# Patient Record
Sex: FEMALE | Race: ASIAN | Employment: UNEMPLOYED | URBAN - METROPOLITAN AREA
[De-identification: names, ages, dates, MRNs, and addresses within clinical notes are randomized per-mention and may not be internally consistent; named-entity substitution may affect disease eponyms.]

---

## 2017-03-06 ENCOUNTER — OFFICE VISIT (OUTPATIENT)
Dept: FAMILY MEDICINE CLINIC | Facility: CLINIC | Age: 57
End: 2017-03-06

## 2017-03-06 VITALS
HEART RATE: 80 BPM | SYSTOLIC BLOOD PRESSURE: 128 MMHG | HEIGHT: 62 IN | DIASTOLIC BLOOD PRESSURE: 70 MMHG | TEMPERATURE: 97 F | WEIGHT: 178 LBS | RESPIRATION RATE: 16 BRPM | BODY MASS INDEX: 32.76 KG/M2

## 2017-03-06 DIAGNOSIS — K63.5 HYPERPLASTIC COLONIC POLYP, UNSPECIFIED PART OF COLON: ICD-10-CM

## 2017-03-06 DIAGNOSIS — E03.9 ACQUIRED HYPOTHYROIDISM: ICD-10-CM

## 2017-03-06 DIAGNOSIS — E11.9 DIET-CONTROLLED TYPE 2 DIABETES MELLITUS (HCC): Chronic | ICD-10-CM

## 2017-03-06 DIAGNOSIS — R30.0 DYSURIA: Primary | ICD-10-CM

## 2017-03-06 DIAGNOSIS — E78.2 MIXED HYPERLIPIDEMIA: ICD-10-CM

## 2017-03-06 LAB
APPEARANCE: CLEAR
BILIRUBIN: NEGATIVE
CREAT UR-SCNC: 117 MG/DL
GLUCOSE (URINE DIPSTICK): NEGATIVE MG/DL
KETONES (URINE DIPSTICK): NEGATIVE MG/DL
MICROALBUMIN UR-MCNC: 1.41 MG/DL
MICROALBUMIN/CREAT 24H UR-RTO: 12.1 UG/MG (ref ?–30)
MULTISTIX LOT#: ABNORMAL NUMERIC
NITRITE, URINE: NEGATIVE
OCCULT BLOOD: NEGATIVE
PH, URINE: 8 (ref 4.5–8)
SPECIFIC GRAVITY: 1.02 (ref 1–1.03)
URINE-COLOR: YELLOW
UROBILINOGEN,SEMI-QN: 0.2 MG/DL (ref 0–1.9)

## 2017-03-06 PROCEDURE — 82043 UR ALBUMIN QUANTITATIVE: CPT | Performed by: FAMILY MEDICINE

## 2017-03-06 PROCEDURE — 81003 URINALYSIS AUTO W/O SCOPE: CPT | Performed by: FAMILY MEDICINE

## 2017-03-06 PROCEDURE — 99214 OFFICE O/P EST MOD 30 MIN: CPT | Performed by: FAMILY MEDICINE

## 2017-03-06 PROCEDURE — 87086 URINE CULTURE/COLONY COUNT: CPT | Performed by: FAMILY MEDICINE

## 2017-03-06 PROCEDURE — 82570 ASSAY OF URINE CREATININE: CPT | Performed by: FAMILY MEDICINE

## 2017-03-06 RX ORDER — NYSTATIN 100000 U/G
1 CREAM TOPICAL 2 TIMES DAILY
Qty: 30 G | Refills: 1 | Status: SHIPPED | OUTPATIENT
Start: 2017-03-06 | End: 2017-03-13

## 2017-03-06 NOTE — PROGRESS NOTES
Patient presents with:  Abdominal Pain: Dx with UTI in [facility] 1 month ago - treated with antibiotics. Still with abdominal dicscomfort.  Pt states had bladder sling placed 10 years ago  Burning On Urination: occ      HPI:   This is a 64year old female comin weakness and numbness. Hematological: Negative for adenopathy. EXAM:   /70 mmHg  Pulse 80  Temp(Src) 96.9 °F (36.1 °C) (Oral)  Resp 16  Ht 62\"  Wt 178 lb  BMI 32.55 kg/m2 Body mass index is 32.55 kg/(m^2).    Physical Exam   Nursing note and compliance and exercise, see ophthalmology soon, check feet daily and will check labs as ordered.                  Relevant Orders    COMP METABOLIC PANEL (14)    LIPID PANEL    HEMOGLOBIN A1C    MICROALB/CREAT RATIO, RANDOM URINE    Hypothyroidism    Overv

## 2017-03-07 NOTE — ASSESSMENT & PLAN NOTE
Stable, Continue present management.     Cholesterol Lowering Medications          SIMVASTATIN 40 MG Oral Tab    Choline Fenofibrate (FENOFIBRIC ACID) 135 MG Oral Capsule Delayed Release

## 2017-03-07 NOTE — ASSESSMENT & PLAN NOTE
As for her Diabetes, it is well controlled, no significant medication side effects noted.      Recommendations are: continue present meds, lose weight by increased dietary compliance and exercise, see ophthalmology soon, check feet daily and will check labs

## 2017-03-07 NOTE — ASSESSMENT & PLAN NOTE
Stable, Continue present management.     Thyroid  (most recent labs)     Lab Results  Component Value Date/Time   TSH 2.300 09/09/2016 12:54 PM   T4F 1.1 09/09/2016 12:54 PM         Endocrine Medications          LEVOTHYROXINE SODIUM 50 MCG Oral Tab

## 2017-03-08 ENCOUNTER — TELEPHONE (OUTPATIENT)
Dept: FAMILY MEDICINE CLINIC | Facility: CLINIC | Age: 57
End: 2017-03-08

## 2017-03-08 NOTE — TELEPHONE ENCOUNTER
Urine culture ninoska back yet. Pt notified. Advised Pt will be notified once urine culture received.

## 2017-04-05 ENCOUNTER — PRIOR ORIGINAL RECORDS (OUTPATIENT)
Dept: OTHER | Age: 57
End: 2017-04-05

## 2017-04-11 ENCOUNTER — TELEPHONE (OUTPATIENT)
Dept: FAMILY MEDICINE CLINIC | Facility: CLINIC | Age: 57
End: 2017-04-11

## 2017-04-11 NOTE — TELEPHONE ENCOUNTER
Received copy of lab results DOS 4/5/17 from Dr. Rocio Choudhary. Lab results abstracted into epic and sent to scan.

## 2017-05-04 ENCOUNTER — TELEPHONE (OUTPATIENT)
Dept: FAMILY MEDICINE CLINIC | Facility: CLINIC | Age: 57
End: 2017-05-04

## 2017-06-18 DIAGNOSIS — E03.9 ACQUIRED HYPOTHYROIDISM: Primary | ICD-10-CM

## 2017-06-20 NOTE — TELEPHONE ENCOUNTER
Incoming request for Levothyroxine. LOV 9/12/2016 last TSH done 9/9/2016. No future appointments. Per LOV patient to f/u in 6 months. Left message for patient to schedule f/u.

## 2017-06-26 RX ORDER — NYSTATIN 100000 U/G
CREAM TOPICAL
Refills: 1 | COMMUNITY
Start: 2017-06-18 | End: 2018-01-26

## 2017-06-26 RX ORDER — LEVOTHYROXINE SODIUM 0.05 MG/1
TABLET ORAL
Qty: 90 TABLET | Refills: 0 | Status: SHIPPED | OUTPATIENT
Start: 2017-06-26 | End: 2017-10-24

## 2017-09-14 ENCOUNTER — OFFICE VISIT (OUTPATIENT)
Dept: OBGYN CLINIC | Facility: CLINIC | Age: 57
End: 2017-09-14

## 2017-09-14 VITALS
BODY MASS INDEX: 28.53 KG/M2 | SYSTOLIC BLOOD PRESSURE: 130 MMHG | WEIGHT: 161 LBS | HEIGHT: 63 IN | DIASTOLIC BLOOD PRESSURE: 70 MMHG

## 2017-09-14 DIAGNOSIS — N94.9 VAGINAL BURNING: ICD-10-CM

## 2017-09-14 DIAGNOSIS — R10.2 PELVIC PAIN: Primary | ICD-10-CM

## 2017-09-14 DIAGNOSIS — Z12.4 ROUTINE PAPANICOLAOU SMEAR: ICD-10-CM

## 2017-09-14 PROCEDURE — 87624 HPV HI-RISK TYP POOLED RSLT: CPT | Performed by: OBSTETRICS & GYNECOLOGY

## 2017-09-14 PROCEDURE — 88175 CYTOPATH C/V AUTO FLUID REDO: CPT | Performed by: OBSTETRICS & GYNECOLOGY

## 2017-09-14 PROCEDURE — 99202 OFFICE O/P NEW SF 15 MIN: CPT | Performed by: OBSTETRICS & GYNECOLOGY

## 2017-09-14 NOTE — PROGRESS NOTES
Patient is a 64year old here for evaluation of one and a half hx of pelvic discomfort associated with vaginal burning. Last seen April 2014 for D&C due to recurrent postmenopausal bleeding.  Benign endometrial polyps with negative background endometrium on once ultrasound returns. Over 20 minutes spent with pt updating hx and evaluation of presenting problems.

## 2017-09-20 LAB — HPV I/H RISK 1 DNA SPEC QL NAA+PROBE: NEGATIVE

## 2017-10-04 ENCOUNTER — PRIOR ORIGINAL RECORDS (OUTPATIENT)
Dept: OTHER | Age: 57
End: 2017-10-04

## 2017-10-24 DIAGNOSIS — E03.9 ACQUIRED HYPOTHYROIDISM: ICD-10-CM

## 2017-10-25 RX ORDER — LEVOTHYROXINE SODIUM 0.05 MG/1
TABLET ORAL
Qty: 90 TABLET | Refills: 1 | Status: SHIPPED | OUTPATIENT
Start: 2017-10-25 | End: 2018-01-26 | Stop reason: DRUGHIGH

## 2017-11-15 ENCOUNTER — HOSPITAL ENCOUNTER (OUTPATIENT)
Dept: MAMMOGRAPHY | Age: 57
Discharge: HOME | End: 2017-11-15
Attending: INTERNAL MEDICINE
Payer: COMMERCIAL

## 2017-11-15 DIAGNOSIS — Z12.31 ENCOUNTER FOR SCREENING MAMMOGRAM FOR MALIGNANT NEOPLASM OF BREAST: ICD-10-CM

## 2017-11-15 PROCEDURE — 77067 SCR MAMMO BI INCL CAD: CPT | Performed by: INTERNAL MEDICINE

## 2017-11-24 DIAGNOSIS — E78.00 HYPERCHOLESTEROLEMIA: ICD-10-CM

## 2017-11-24 DIAGNOSIS — E03.9 ACQUIRED HYPOTHYROIDISM: Primary | ICD-10-CM

## 2017-11-28 ENCOUNTER — APPOINTMENT (OUTPATIENT)
Dept: LAB | Age: 57
End: 2017-11-28
Attending: FAMILY MEDICINE
Payer: COMMERCIAL

## 2017-11-28 DIAGNOSIS — E03.9 ACQUIRED HYPOTHYROIDISM: ICD-10-CM

## 2017-11-28 DIAGNOSIS — E11.9 DIET-CONTROLLED TYPE 2 DIABETES MELLITUS (HCC): Chronic | ICD-10-CM

## 2017-11-28 PROCEDURE — 80053 COMPREHEN METABOLIC PANEL: CPT | Performed by: FAMILY MEDICINE

## 2017-11-28 PROCEDURE — 36415 COLL VENOUS BLD VENIPUNCTURE: CPT | Performed by: FAMILY MEDICINE

## 2017-11-28 PROCEDURE — 83036 HEMOGLOBIN GLYCOSYLATED A1C: CPT | Performed by: FAMILY MEDICINE

## 2017-11-28 PROCEDURE — 84439 ASSAY OF FREE THYROXINE: CPT | Performed by: FAMILY MEDICINE

## 2017-11-28 PROCEDURE — 84443 ASSAY THYROID STIM HORMONE: CPT | Performed by: FAMILY MEDICINE

## 2017-11-28 PROCEDURE — 80061 LIPID PANEL: CPT | Performed by: FAMILY MEDICINE

## 2017-11-29 RX ORDER — ALPRAZOLAM 0.5 MG/1
TABLET ORAL
Qty: 30 TABLET | Refills: 1 | OUTPATIENT
Start: 2017-11-29 | End: 2018-11-28

## 2017-11-29 RX ORDER — LEVOTHYROXINE SODIUM 0.07 MG/1
75 TABLET ORAL
Qty: 90 TABLET | Refills: 1 | Status: SHIPPED | OUTPATIENT
Start: 2017-11-29 | End: 2018-11-28

## 2017-11-29 RX ORDER — FENOFIBRIC ACID 135 MG/1
CAPSULE, DELAYED RELEASE ORAL
Qty: 90 CAPSULE | Refills: 0 | Status: SHIPPED | OUTPATIENT
Start: 2017-11-29 | End: 2018-01-23

## 2017-11-29 RX ORDER — SIMVASTATIN 40 MG
TABLET ORAL
Qty: 90 TABLET | Refills: 0 | Status: SHIPPED | OUTPATIENT
Start: 2017-11-29 | End: 2018-01-23

## 2017-11-29 NOTE — TELEPHONE ENCOUNTER
Labs were done has been taking levothyroxine 50 mcg so sent in new order for 75 mcg she is also asking to get refill of alprazolam. I told her she might need a appointment but she will call back to be seen in December.

## 2017-11-29 NOTE — TELEPHONE ENCOUNTER
Rx for Alprazolam called to [facility]. Called pt and notified her that she needs appt in 1-2 months. She said that she will call back to schedule appt.

## 2017-11-30 ENCOUNTER — HOSPITAL ENCOUNTER (OUTPATIENT)
Dept: MAMMOGRAPHY | Facility: HOSPITAL | Age: 57
Discharge: HOME | End: 2017-11-30
Attending: INTERNAL MEDICINE
Payer: COMMERCIAL

## 2017-11-30 DIAGNOSIS — R92.2 INCONCLUSIVE MAMMOGRAM: ICD-10-CM

## 2017-11-30 PROCEDURE — 77061 BREAST TOMOSYNTHESIS UNI: CPT | Performed by: INTERNAL MEDICINE

## 2017-11-30 PROCEDURE — 77065 DX MAMMO INCL CAD UNI: CPT | Performed by: INTERNAL MEDICINE

## 2017-11-30 PROCEDURE — 76642 ULTRASOUND BREAST LIMITED: CPT | Performed by: INTERNAL MEDICINE

## 2017-11-30 NOTE — IMAGING NOTE
Asssisted Dr. Ray Camacho with recommendation for a left breast biopsy for nodule. Emotional and educational support provided. Written information provided to [facility].  Our breast center schedulers will call pt within 72 hours to schedule an appointmen

## 2017-12-05 ENCOUNTER — HOSPITAL ENCOUNTER (OUTPATIENT)
Dept: MAMMOGRAPHY | Facility: HOSPITAL | Age: 57
Discharge: HOME | End: 2017-12-05
Attending: INTERNAL MEDICINE
Payer: COMMERCIAL

## 2017-12-05 DIAGNOSIS — N63.0 BREAST MASS: ICD-10-CM

## 2017-12-05 PROCEDURE — 19081 BX BREAST 1ST LESION STRTCTC: CPT | Performed by: INTERNAL MEDICINE

## 2017-12-05 PROCEDURE — 88305 TISSUE EXAM BY PATHOLOGIST: CPT | Performed by: INTERNAL MEDICINE

## 2017-12-06 ENCOUNTER — TELEPHONE (OUTPATIENT)
Dept: MAMMOGRAPHY | Facility: HOSPITAL | Age: 57
End: 2017-12-06

## 2017-12-06 NOTE — TELEPHONE ENCOUNTER
Telephoned Renetta Corbett and name,  verified with pt. Notified Renetta Corbett of benign left breast stereotactic biopsy result. Senait Yoo reports biopsy site is healing well. Hematoma management discussed.  Radiologist recommends next mammogram

## 2017-12-31 ENCOUNTER — PRIOR ORIGINAL RECORDS (OUTPATIENT)
Dept: OTHER | Age: 57
End: 2017-12-31

## 2018-01-23 DIAGNOSIS — E78.00 HYPERCHOLESTEROLEMIA: ICD-10-CM

## 2018-01-23 DIAGNOSIS — R30.0 DYSURIA: ICD-10-CM

## 2018-01-24 RX ORDER — FENOFIBRIC ACID 135 MG/1
CAPSULE, DELAYED RELEASE ORAL
Qty: 90 CAPSULE | Refills: 0 | Status: SHIPPED | OUTPATIENT
Start: 2018-01-24 | End: 2018-11-28 | Stop reason: ALTCHOICE

## 2018-01-24 RX ORDER — SIMVASTATIN 40 MG
TABLET ORAL
Qty: 90 TABLET | Refills: 0 | Status: SHIPPED | OUTPATIENT
Start: 2018-01-24 | End: 2018-11-28

## 2018-01-24 NOTE — TELEPHONE ENCOUNTER
Please call Pt and assist with scheduling Diabetes follow up. .  Pt is past due Then after appt made, forward message to Dr Osei Taylor for refill approval for Nystatin. Routed to [facility].

## 2018-01-24 NOTE — TELEPHONE ENCOUNTER
Pt out of town. .will be back in 1 month and states she will call back to schedule her Diabetes appt w/Dr Willis Pair

## 2018-01-26 DIAGNOSIS — E03.9 ACQUIRED HYPOTHYROIDISM: ICD-10-CM

## 2018-01-26 RX ORDER — NYSTATIN 100000 U/G
CREAM TOPICAL
Qty: 30 G | Refills: 1 | Status: SHIPPED | OUTPATIENT
Start: 2018-01-26 | End: 2018-11-28

## 2018-01-26 NOTE — TELEPHONE ENCOUNTER
Pt requesting refill Nystatin. All other refills have bene addressed. Nystatin order pended. Will you refill ? Routed to DR Jones.

## 2018-01-26 NOTE — TELEPHONE ENCOUNTER
Pt made appt for late March for when she comes back from [facility] but is leaving tomorrow morning and would like to have FENOFIBRIC ACID 135 MG Oral Capsule Delayed Release, SIMVASTATIN 40 MG Oral Tab,nystatin 900037 UNIT/GM External Cream these refilled to t

## 2018-01-26 NOTE — TELEPHONE ENCOUNTER
Please call Pt and advise that she is past due for appt per Dr Winston Thompson instruction at her [address] [place] [address] Avenue 03/06/2017. Once appt is made, please forward refill request pended to Dr Winston Thompson. Routed to [facility].

## 2018-01-31 RX ORDER — NYSTATIN 100000 U/G
CREAM TOPICAL
Qty: 30 G | Refills: 0 | OUTPATIENT
Start: 2018-01-31

## 2018-04-24 ENCOUNTER — APPOINTMENT (OUTPATIENT)
Dept: HEMATOLOGY/ONCOLOGY | Facility: HOSPITAL | Age: 58
End: 2018-04-24
Attending: INTERNAL MEDICINE
Payer: COMMERCIAL

## 2018-05-09 ENCOUNTER — OFFICE VISIT (OUTPATIENT)
Dept: HEMATOLOGY/ONCOLOGY | Facility: HOSPITAL | Age: 58
End: 2018-05-09
Attending: INTERNAL MEDICINE
Payer: COMMERCIAL

## 2018-05-09 VITALS
HEART RATE: 77 BPM | OXYGEN SATURATION: 95 % | TEMPERATURE: 97 F | RESPIRATION RATE: 18 BRPM | BODY MASS INDEX: 30.19 KG/M2 | SYSTOLIC BLOOD PRESSURE: 146 MMHG | WEIGHT: 170.38 LBS | DIASTOLIC BLOOD PRESSURE: 83 MMHG | HEIGHT: 62.99 IN

## 2018-05-09 DIAGNOSIS — R92.8 ABNORMAL MAMMOGRAM: Primary | ICD-10-CM

## 2018-05-09 DIAGNOSIS — C91.01 ACUTE LYMPHOCYTIC LEUKEMIA IN REMISSION (HCC): ICD-10-CM

## 2018-05-09 DIAGNOSIS — E03.9 HYPOTHYROIDISM, UNSPECIFIED TYPE: ICD-10-CM

## 2018-05-09 DIAGNOSIS — E78.5 HYPERLIPIDEMIA, UNSPECIFIED HYPERLIPIDEMIA TYPE: ICD-10-CM

## 2018-05-09 DIAGNOSIS — D56.3 BETA THALASSEMIA TRAIT: ICD-10-CM

## 2018-05-09 DIAGNOSIS — R71.8 MICROCYTOSIS: ICD-10-CM

## 2018-05-09 PROCEDURE — 99204 OFFICE O/P NEW MOD 45 MIN: CPT | Performed by: INTERNAL MEDICINE

## 2018-05-09 NOTE — CONSULTS
Cancer Center Report of Consultation    Patient Name: Howard Contreras   YOB: 1960   Medical Record Number: [id number]   CSN: [id number]   Consulting Physician: Nima Delgado MD  Referring Physician(s): No ref.  provider found  Date of Consu Leukemia ([facility] [address]     2003, remission since then   • Osteoporosis 11/5/2012   • Pap smear for cervical cancer screening 10/4/2010    wnl   • Thalassemia 11/5/2012   • Urinary incontinence     surgery on4/22/2010/pubovaginal slng       Past Surgical History: tablet, Rfl: 0  •  Levothyroxine Sodium 75 MCG Oral Tab, Take 1 tablet (75 mcg total) by mouth before breakfast., Disp: 90 tablet, Rfl: 1  •  ALPRAZolam 0.5 MG Oral Tab, TAKE 1 TABLET BY MOUTH EVERY NIGHT AT BEDTIME AS NEEDED FOR SLEEP, Disp: 30 tablet, Rf No palpable lymphadenopathy. Neck is supple. Lymphatics: There is no palpable lymphadenopathy  in the cervical, axillary, or inguinal regions. Chest: Clear to auscultation. Heart: Regular rate and rhythm.  S1S2 normal.  Abdomen: Soft, non tender with goo

## 2018-05-09 NOTE — PROGRESS NOTES
Patient here for consult. Patient was previously a patient with Dr Kalyn Rodrigez, but they no longer accept her insurance. Patient arrived with some records, copied. I called office and they will fax over notes.  All imaging and path has been done at [facility] though

## 2018-05-10 ENCOUNTER — TELEPHONE (OUTPATIENT)
Dept: FAMILY MEDICINE CLINIC | Facility: CLINIC | Age: 58
End: 2018-05-10

## 2018-05-10 ENCOUNTER — TELEPHONE (OUTPATIENT)
Dept: HEMATOLOGY/ONCOLOGY | Facility: HOSPITAL | Age: 58
End: 2018-05-10

## 2018-05-10 NOTE — TELEPHONE ENCOUNTER
\"Call, vitamin D slightly low, recommend 2000 units OTC daily.  Other labs are pending and will contact her when everything is back. \"    LM on VM

## 2018-05-10 NOTE — TELEPHONE ENCOUNTER
Needs DM follow up visit   Received: Today   Message Contents   Surya Sam MD              LOV 3/2017, canceled 3/2018 visit, A1c 6.65, overdue for annual recheck- ok to call and schedule, Dr Marga Dc just did A1c . Thanks, Jody Humphries MD

## 2018-05-21 ENCOUNTER — MED REC SCAN ONLY (OUTPATIENT)
Dept: HEMATOLOGY/ONCOLOGY | Facility: HOSPITAL | Age: 58
End: 2018-05-21

## 2018-10-25 ENCOUNTER — TELEPHONE (OUTPATIENT)
Dept: FAMILY MEDICINE CLINIC | Facility: CLINIC | Age: 58
End: 2018-10-25

## 2018-10-25 NOTE — TELEPHONE ENCOUNTER
[facility] for Renetta to return a call to Triage

## 2018-10-30 NOTE — TELEPHONE ENCOUNTER
Patient has not been seen in our office for over one year, pt will need to be seen by Dr Jacqui Emery or Mid-level provider to establish a diagnosis and OV notes in order to issue a referral

## 2018-11-01 NOTE — TELEPHONE ENCOUNTER
LMOM for pt to confirm that she has an appt for her Physical w/Dr Lindsay Leiva on 11/28/18 (did pt wants to get referral for ENT at that time or make seperate appt?)

## 2018-11-05 NOTE — TELEPHONE ENCOUNTER
Left message once again for patient to return our call to see if she wants to come in sooner to referral to ENT

## 2018-11-28 ENCOUNTER — OFFICE VISIT (OUTPATIENT)
Dept: FAMILY MEDICINE CLINIC | Facility: CLINIC | Age: 58
End: 2018-11-28
Payer: COMMERCIAL

## 2018-11-28 VITALS
HEART RATE: 70 BPM | DIASTOLIC BLOOD PRESSURE: 70 MMHG | RESPIRATION RATE: 16 BRPM | BODY MASS INDEX: 30.55 KG/M2 | HEIGHT: 62 IN | WEIGHT: 166 LBS | SYSTOLIC BLOOD PRESSURE: 116 MMHG | TEMPERATURE: 97 F

## 2018-11-28 DIAGNOSIS — Z00.00 ANNUAL PHYSICAL EXAM: Primary | ICD-10-CM

## 2018-11-28 DIAGNOSIS — E78.2 MIXED HYPERLIPIDEMIA: ICD-10-CM

## 2018-11-28 DIAGNOSIS — E11.9 DIET-CONTROLLED TYPE 2 DIABETES MELLITUS (HCC): Chronic | ICD-10-CM

## 2018-11-28 DIAGNOSIS — E03.9 ACQUIRED HYPOTHYROIDISM: ICD-10-CM

## 2018-11-28 DIAGNOSIS — R92.8 MAMMOGRAM ABNORMAL: ICD-10-CM

## 2018-11-28 PROCEDURE — 82570 ASSAY OF URINE CREATININE: CPT | Performed by: FAMILY MEDICINE

## 2018-11-28 PROCEDURE — 82043 UR ALBUMIN QUANTITATIVE: CPT | Performed by: FAMILY MEDICINE

## 2018-11-28 PROCEDURE — 83036 HEMOGLOBIN GLYCOSYLATED A1C: CPT | Performed by: FAMILY MEDICINE

## 2018-11-28 PROCEDURE — 99396 PREV VISIT EST AGE 40-64: CPT | Performed by: FAMILY MEDICINE

## 2018-11-28 RX ORDER — OMEGA-3-ACID ETHYL ESTERS 1 G/1
1 CAPSULE, LIQUID FILLED ORAL DAILY
COMMUNITY

## 2018-11-28 RX ORDER — SIMVASTATIN 40 MG
40 TABLET ORAL NIGHTLY
Qty: 90 TABLET | Refills: 3 | Status: SHIPPED | OUTPATIENT
Start: 2018-11-28 | End: 2020-01-08

## 2018-11-28 RX ORDER — ALPRAZOLAM 0.5 MG/1
TABLET ORAL
Qty: 30 TABLET | Refills: 1 | Status: SHIPPED | OUTPATIENT
Start: 2018-11-28 | End: 2021-04-09

## 2018-11-28 RX ORDER — LEVOTHYROXINE SODIUM 0.07 MG/1
75 TABLET ORAL
Qty: 90 TABLET | Refills: 1 | Status: SHIPPED | OUTPATIENT
Start: 2018-11-28 | End: 2019-05-16

## 2018-11-28 RX ORDER — NYSTATIN 100000 U/G
CREAM TOPICAL
Qty: 30 G | Refills: 1 | Status: SHIPPED | OUTPATIENT
Start: 2018-11-28 | End: 2021-06-02

## 2018-11-28 NOTE — PROGRESS NOTES
Husam Bernstein is a 62year old female who presents for a complete physical exam.   HPI:   Patient presents with:  Physical: WWE no pap, Patient brought her Labs  Thyroid Problem: Has questions on Levothroxine dosage     Her last annual assessment has bee PM    AST 19 05/09/2018 02:49 PM    ALT 38 05/09/2018 02:49 PM    BILT 0.3 05/09/2018 02:49 PM    TSH 1.600 05/09/2018 02:49 PM    T4F 1.2 05/09/2018 02:49 PM        Lab Results   Component Value Date/Time    CHOLEST 176 05/09/2018 02:49 PM    HDL 57 05/09 medication(s): levothyroxine sodium, simvastatin, simvastatin, levothyroxine sodium, and aspirin. She is allergic to vancomycin. She  reports that  has never smoked. she has never used smokeless tobacco. She reports that she drinks alcohol.  She reports Eyes: Conjunctivae and EOM are normal. Pupils are equal, round, and reactive to light. Neck: Trachea normal and normal range of motion. Neck supple. Normal carotid pulses present. No edema present. No thyroid mass and no thyromegaly present.    Cleopatra Pinna ASSESSMENT AND PLAN:   Pilo Trevino is a 62year old female who presents for a complete physical exam.   Pt's weight is Body mass index is 30.36 kg/m². , recommended low fat diet and aerobic exercise 30 minutes three times weekly.    Health maintenanc Cream    Other Relevant Orders    TSH+FREE T4      Other Visit Diagnoses     Annual physical exam    -  Primary    Mammogram abnormal        Relevant Orders    RAYNA SCREENING BILAT (CPT=77067)         Return in about 6 months (around 5/28/2019) for recheck,

## 2018-12-04 RX ORDER — ALPRAZOLAM 0.5 MG/1
TABLET ORAL
Qty: 30 TABLET | Refills: 0 | OUTPATIENT
Start: 2018-12-04

## 2018-12-04 NOTE — TELEPHONE ENCOUNTER
LOV 11/28/2018     No future appointments.      Refill request for:    Requested Prescriptions     Pending Prescriptions Disp Refills   • ALPRAZolam 0.5 MG Oral Tab [Pharmacy Med Name: ALPRAZOLAM 0.5MG TABLETS] 30 tablet 0     Sig: TAKE ONE TABLET BY MOUTH

## 2018-12-05 NOTE — TELEPHONE ENCOUNTER
Called [facility] and they do not have any Alprazolam refills. Called pt and she found prescription.  She will take it to [facility]

## 2019-01-22 ENCOUNTER — OFFICE VISIT (OUTPATIENT)
Dept: HEMATOLOGY/ONCOLOGY | Facility: HOSPITAL | Age: 59
End: 2019-01-22
Attending: INTERNAL MEDICINE
Payer: COMMERCIAL

## 2019-01-22 ENCOUNTER — TELEPHONE (OUTPATIENT)
Dept: HEMATOLOGY/ONCOLOGY | Facility: HOSPITAL | Age: 59
End: 2019-01-22

## 2019-01-22 VITALS
BODY MASS INDEX: 30.78 KG/M2 | RESPIRATION RATE: 18 BRPM | HEIGHT: 62.01 IN | DIASTOLIC BLOOD PRESSURE: 83 MMHG | SYSTOLIC BLOOD PRESSURE: 158 MMHG | WEIGHT: 169.38 LBS | OXYGEN SATURATION: 98 % | TEMPERATURE: 97 F | HEART RATE: 79 BPM

## 2019-01-22 DIAGNOSIS — C91.01 ACUTE LYMPHOCYTIC LEUKEMIA IN REMISSION (HCC): Primary | ICD-10-CM

## 2019-01-22 DIAGNOSIS — R92.8 ABNORMAL MAMMOGRAM: ICD-10-CM

## 2019-01-22 DIAGNOSIS — D56.3 BETA THALASSEMIA TRAIT: ICD-10-CM

## 2019-01-22 LAB
ALBUMIN SERPL-MCNC: 4.1 G/DL (ref 3.1–4.5)
ALBUMIN/GLOB SERPL: 1 {RATIO} (ref 1–2)
ALP LIVER SERPL-CCNC: 105 U/L (ref 46–118)
ALT SERPL-CCNC: 30 U/L (ref 14–54)
ANION GAP SERPL CALC-SCNC: 5 MMOL/L (ref 0–18)
AST SERPL-CCNC: 17 U/L (ref 15–41)
BASOPHILS # BLD AUTO: 0.03 X10(3) UL (ref 0–0.1)
BASOPHILS NFR BLD AUTO: 0.3 %
BILIRUB SERPL-MCNC: 0.4 MG/DL (ref 0.1–2)
BUN BLD-MCNC: 10 MG/DL (ref 8–20)
BUN/CREAT SERPL: 18.9 (ref 10–20)
CALCIUM BLD-MCNC: 10 MG/DL (ref 8.3–10.3)
CHLORIDE SERPL-SCNC: 104 MMOL/L (ref 101–111)
CO2 SERPL-SCNC: 31 MMOL/L (ref 22–32)
CREAT BLD-MCNC: 0.53 MG/DL (ref 0.55–1.02)
EOSINOPHIL # BLD AUTO: 0.11 X10(3) UL (ref 0–0.3)
EOSINOPHIL NFR BLD AUTO: 1 %
ERYTHROCYTE [DISTWIDTH] IN BLOOD BY AUTOMATED COUNT: 17.8 % (ref 11.5–16)
GLOBULIN PLAS-MCNC: 4 G/DL (ref 2.8–4.4)
GLUCOSE BLD-MCNC: 89 MG/DL (ref 70–99)
HCT VFR BLD AUTO: 42.6 % (ref 34–50)
HGB BLD-MCNC: 12.9 G/DL (ref 12–16)
IMMATURE GRANULOCYTE COUNT: 0.04 X10(3) UL (ref 0–1)
IMMATURE GRANULOCYTE RATIO %: 0.4 %
LDH: 143 U/L (ref 84–246)
LYMPHOCYTES # BLD AUTO: 4.12 X10(3) UL (ref 0.9–4)
LYMPHOCYTES NFR BLD AUTO: 38.5 %
M PROTEIN MFR SERPL ELPH: 8.1 G/DL (ref 6.4–8.2)
MCH RBC QN AUTO: 19.3 PG (ref 27–33.2)
MCHC RBC AUTO-ENTMCNC: 30.3 G/DL (ref 31–37)
MCV RBC AUTO: 63.8 FL (ref 81–100)
MONOCYTES # BLD AUTO: 0.61 X10(3) UL (ref 0.1–1)
MONOCYTES NFR BLD AUTO: 5.7 %
NEUTROPHIL ABS PRELIM: 5.78 X10 (3) UL (ref 1.3–6.7)
NEUTROPHILS # BLD AUTO: 5.78 X10(3) UL (ref 1.3–6.7)
NEUTROPHILS NFR BLD AUTO: 54.1 %
OSMOLALITY SERPL CALC.SUM OF ELEC: 289 MOSM/KG (ref 275–295)
PLATELET # BLD AUTO: 272 10(3)UL (ref 150–450)
POTASSIUM SERPL-SCNC: 3.8 MMOL/L (ref 3.6–5.1)
RBC # BLD AUTO: 6.68 X10(6)UL (ref 3.8–5.1)
RED CELL DISTRIBUTION WIDTH-SD: 34.5 FL (ref 35.1–46.3)
SODIUM SERPL-SCNC: 140 MMOL/L (ref 136–144)
WBC # BLD AUTO: 10.7 X10(3) UL (ref 4–13)

## 2019-01-22 PROCEDURE — 99214 OFFICE O/P EST MOD 30 MIN: CPT | Performed by: INTERNAL MEDICINE

## 2019-01-22 NOTE — TELEPHONE ENCOUNTER
Selvin Sharp MD  [address]             Call, labs stable      Spoke with pt, verbalized understanding.

## 2019-01-22 NOTE — PROGRESS NOTES
[facility] Progress Note      Patient Name:  Mesfin Gutiérrez  YOB: 1960  Medical Record Number:  [id number]    Date of visit:  1/22/2019    CHIEF COMPLAINT: H/o ALL.     HPI:     62year old that I saw as a new patient in 5/18 afte tablet Rfl: 1   Levothyroxine Sodium 75 MCG Oral Tab Take 1 tablet (75 mcg total) by mouth before breakfast. Disp: 90 tablet Rfl: 1   nystatin 391245 UNIT/GM External Cream IGOR EXT AA BID Disp: 30 g Rfl: 1   simvastatin 40 MG Oral Tab Take 1 tablet (40 mg Ratio 18.9 10.0 - 20.0    Calcium, Total 10.0 8.3 - 10.3 mg/dL    Calculated Osmolality 289 275 - 295 mOsm/kg    GFR, Non- 105 >=60    GFR, -American 121 >=60    AST 17 15 - 41 U/L    Alt 30 14 - 54 U/L    Alkaline Phosphatase 105 46 anxious about getting mammogram done before. Will do in February. # Microcytosis: Hb electrophoresis 2002-elevated hemoglobin A2, suggestive of beta thalassemia trait.     ORDERS PLACED:          CBC W/DIFF      COMP METABOLIC PANEL      LDH [E]       R

## 2019-04-04 ENCOUNTER — TELEPHONE (OUTPATIENT)
Dept: FAMILY MEDICINE CLINIC | Facility: CLINIC | Age: 59
End: 2019-04-04

## 2019-04-04 DIAGNOSIS — R73.9 HYPERGLYCEMIA: ICD-10-CM

## 2019-04-04 DIAGNOSIS — Z00.00 LABORATORY EXAMINATION ORDERED AS PART OF A ROUTINE GENERAL MEDICAL EXAMINATION: ICD-10-CM

## 2019-04-04 DIAGNOSIS — E78.2 MIXED HYPERLIPIDEMIA: ICD-10-CM

## 2019-04-04 DIAGNOSIS — Z12.31 VISIT FOR SCREENING MAMMOGRAM: ICD-10-CM

## 2019-04-04 DIAGNOSIS — E11.9 DIET-CONTROLLED TYPE 2 DIABETES MELLITUS (HCC): Primary | Chronic | ICD-10-CM

## 2019-04-04 DIAGNOSIS — Z11.59 ENCOUNTER FOR HEPATITIS C SCREENING TEST FOR LOW RISK PATIENT: ICD-10-CM

## 2019-04-04 DIAGNOSIS — E03.9 ACQUIRED HYPOTHYROIDISM: ICD-10-CM

## 2019-04-04 NOTE — TELEPHONE ENCOUNTER
Please enter lab orders for the patient's upcoming physical appointment. Physical scheduled:    Your appointments     Date & Time Appointment Department [facility])    May 10, 2019  2:00 PM CDT Physical - Established Patient with MD Annemarie Gray

## 2019-04-24 ENCOUNTER — TELEPHONE (OUTPATIENT)
Dept: FAMILY MEDICINE CLINIC | Facility: CLINIC | Age: 59
End: 2019-04-24

## 2019-04-24 DIAGNOSIS — E78.00 HYPERCHOLESTEROLEMIA: ICD-10-CM

## 2019-04-24 RX ORDER — FENOFIBRIC ACID 135 MG/1
1 CAPSULE, DELAYED RELEASE ORAL
Qty: 90 CAPSULE | Refills: 0 | Status: SHIPPED | OUTPATIENT
Start: 2019-04-24 | End: 2019-07-22

## 2019-04-24 NOTE — TELEPHONE ENCOUNTER
LDL had been 75 and is up dramatically, triglycerides were down to 74 and now they are up. I would continue the fenofibric acid and work on therapeutic lifestyle changes as well.   Thanks

## 2019-04-24 NOTE — TELEPHONE ENCOUNTER
Pt requesting a call back regarding her test results and if she should continue on  Fenofibric acid Med?  States results were  Cholesterol 233  Tryglycerites 162  HDL     57 and  LDL      144

## 2019-04-24 NOTE — TELEPHONE ENCOUNTER
Called and talked to patient and she had this done by a relative who has a weight loss clinic who did this lab in his office

## 2019-05-03 ENCOUNTER — HOSPITAL ENCOUNTER (OUTPATIENT)
Dept: GENERAL RADIOLOGY | Age: 59
Discharge: HOME | End: 2019-05-03
Attending: FAMILY MEDICINE
Payer: COMMERCIAL

## 2019-05-03 ENCOUNTER — OFFICE VISIT (OUTPATIENT)
Dept: FAMILY MEDICINE CLINIC | Facility: CLINIC | Age: 59
End: 2019-05-03
Payer: COMMERCIAL

## 2019-05-03 VITALS
RESPIRATION RATE: 14 BRPM | BODY MASS INDEX: 30.54 KG/M2 | HEART RATE: 72 BPM | TEMPERATURE: 98 F | HEIGHT: 63 IN | SYSTOLIC BLOOD PRESSURE: 138 MMHG | WEIGHT: 172.38 LBS | DIASTOLIC BLOOD PRESSURE: 86 MMHG

## 2019-05-03 DIAGNOSIS — M25.552 LEFT HIP PAIN: Primary | ICD-10-CM

## 2019-05-03 DIAGNOSIS — M25.552 LEFT HIP PAIN: ICD-10-CM

## 2019-05-03 PROCEDURE — 73503 X-RAY EXAM HIP UNI 4/> VIEWS: CPT | Performed by: FAMILY MEDICINE

## 2019-05-03 PROCEDURE — 99213 OFFICE O/P EST LOW 20 MIN: CPT | Performed by: FAMILY MEDICINE

## 2019-05-03 RX ORDER — METAXALONE 800 MG/1
TABLET ORAL 3 TIMES DAILY PRN
Qty: 30 TABLET | Refills: 1 | Status: SHIPPED | OUTPATIENT
Start: 2019-05-03 | End: 2019-05-23

## 2019-05-03 RX ORDER — NAPROXEN 500 MG/1
500 TABLET ORAL 2 TIMES DAILY PRN
Qty: 60 TABLET | Refills: 0 | Status: SHIPPED | OUTPATIENT
Start: 2019-05-03 | End: 2021-04-07

## 2019-05-03 NOTE — PROGRESS NOTES
Patient presents with:  Knee Pain: left x 3 days       Subjective   HPI:   This is a 62year old female coming in for 3 days sudden onset pain, unable to get up stairs or into car. 400mg helped a little.  No recent films    HPI   See reviewed tab for PMSFHx strength. Tenderness   The patient is experiencing no tenderness. Range of Motion   The patient has normal right knee ROM. Left Knee Exam   Left knee exam is normal.    Muscle Strength   The patient has normal left knee strength.     Tenderness

## 2019-05-06 DIAGNOSIS — R10.84 GENERALIZED ABDOMINAL PAIN: Primary | ICD-10-CM

## 2019-05-14 ENCOUNTER — TELEPHONE (OUTPATIENT)
Dept: FAMILY MEDICINE CLINIC | Facility: CLINIC | Age: 59
End: 2019-05-14

## 2019-05-14 DIAGNOSIS — M79.605 PAIN IN BOTH LOWER EXTREMITIES: Primary | ICD-10-CM

## 2019-05-14 DIAGNOSIS — M79.604 PAIN IN BOTH LOWER EXTREMITIES: Primary | ICD-10-CM

## 2019-05-14 NOTE — TELEPHONE ENCOUNTER
Patient is calling she is still having trouble with her legs she is requesting PT. She is having pain and unable to walk straight.

## 2019-05-15 NOTE — TELEPHONE ENCOUNTER
Mary Lou Turner [phone]  called [facility] to call back to get contact information

## 2019-05-15 NOTE — TELEPHONE ENCOUNTER
Diagnoses and all orders for this visit:    Pain in both lower extremities  -     OP REFERRAL TO [facility]         OK to notify.  Thanks, Elham Whipple MD

## 2019-05-16 DIAGNOSIS — E03.9 ACQUIRED HYPOTHYROIDISM: ICD-10-CM

## 2019-05-16 RX ORDER — LEVOTHYROXINE SODIUM 0.07 MG/1
TABLET ORAL
Qty: 90 TABLET | Refills: 0 | Status: SHIPPED | OUTPATIENT
Start: 2019-05-16 | End: 2020-01-06

## 2019-05-16 NOTE — TELEPHONE ENCOUNTER
Refill request for Levothyroxine fails protocol because last thyroid labs were done 5/9/18. There are thyroid labs ordered, but they have not been done. LOV 5/3/19. No future appointments. Routed to Dr Zenovia Cooks.

## 2019-05-22 ENCOUNTER — APPOINTMENT (OUTPATIENT)
Dept: PHYSICAL THERAPY | Facility: HOSPITAL | Age: 59
End: 2019-05-22
Attending: FAMILY MEDICINE
Payer: COMMERCIAL

## 2019-05-22 ENCOUNTER — TELEPHONE (OUTPATIENT)
Dept: FAMILY MEDICINE CLINIC | Facility: CLINIC | Age: 59
End: 2019-05-22

## 2019-05-22 NOTE — TELEPHONE ENCOUNTER
Called and talked to patient got some relief from meds but having pain they cancelled the PT I explained that the PT would help with the pain and they should reschedule this so they will do that to see if it helps

## 2019-05-22 NOTE — TELEPHONE ENCOUNTER
Patient is calling because she still has ongoing knee and hip pain. LOV was with Dr. Hakeem Blair on 5/3/19. Would prefer to discuss with nurse before scheduling an appointment.

## 2019-05-29 ENCOUNTER — HOSPITAL ENCOUNTER (OUTPATIENT)
Dept: PHYSICAL THERAPY | Facility: HOSPITAL | Age: 59
Setting detail: THERAPIES SERIES
Discharge: HOME | End: 2019-05-29
Attending: FAMILY MEDICINE
Payer: COMMERCIAL

## 2019-05-29 DIAGNOSIS — M79.605 PAIN IN BOTH LOWER EXTREMITIES: ICD-10-CM

## 2019-05-29 DIAGNOSIS — M79.604 PAIN IN BOTH LOWER EXTREMITIES: ICD-10-CM

## 2019-05-29 PROCEDURE — 97140 MANUAL THERAPY 1/> REGIONS: CPT

## 2019-05-29 PROCEDURE — 97110 THERAPEUTIC EXERCISES: CPT

## 2019-05-29 PROCEDURE — 97161 PT EVAL LOW COMPLEX 20 MIN: CPT

## 2019-05-29 NOTE — PROGRESS NOTES
SPINE EVALUATION:   Referring Physician: Dr. Zenovia Cooks  Diagnosis: Pain in both lower extremities     Date of Service: 5/29/2019     PATIENT Daniel Rubio is a 62year old y/o female who presents to therapy today with complaints of L leg pain for session  Gait: antalgic with decreased weightbear L LE  Neuro Screen: intact light touch, symmetrically, DTRs-difficult to assess secondary to pt muscle guarding with testing    Lumbar ROM:   Flexion: 90  Extension: 25  Sidebending: R 20; L 20  Rotation: R AROM flexion to >/=100 deg to allow increase ease with bending forward to don shoes (8 visits)  · Pt will report improved symptom centralization and absence of radicular symptoms for 3 consecutive days to improve function with ADL (8 visits)  · Pt will hav

## 2019-06-04 ENCOUNTER — HOSPITAL ENCOUNTER (OUTPATIENT)
Dept: PHYSICAL THERAPY | Facility: HOSPITAL | Age: 59
Setting detail: THERAPIES SERIES
Discharge: HOME | End: 2019-06-04
Attending: FAMILY MEDICINE
Payer: COMMERCIAL

## 2019-06-04 PROCEDURE — 97140 MANUAL THERAPY 1/> REGIONS: CPT

## 2019-06-04 PROCEDURE — 97110 THERAPEUTIC EXERCISES: CPT

## 2019-06-04 NOTE — PROGRESS NOTES
Dx: Pain in both lower extremities            Authorized # of Visits:  No c/p, no auth, 50 visit limit, poc 8         Next MD visit: none scheduled  Fall Risk: standard         Precautions: n/a             Subjective: Feeling better. Pain 5/10.  Getting eas

## 2019-06-10 ENCOUNTER — HOSPITAL ENCOUNTER (OUTPATIENT)
Dept: PHYSICAL THERAPY | Facility: HOSPITAL | Age: 59
Setting detail: THERAPIES SERIES
Discharge: HOME | End: 2019-06-10
Attending: FAMILY MEDICINE
Payer: COMMERCIAL

## 2019-06-10 PROCEDURE — 97140 MANUAL THERAPY 1/> REGIONS: CPT

## 2019-06-10 PROCEDURE — 97110 THERAPEUTIC EXERCISES: CPT

## 2019-06-10 NOTE — PROGRESS NOTES
Dx: Pain in both lower extremities            Authorized # of Visits:  No c/p, no auth, 50 visit limit, poc 8         Next MD visit: none scheduled  Fall Risk: standard         Precautions: n/a             Subjective: Continued decrease in symptoms in back form        L sciatic nerve flossing on/off x20    L HSS x30 sec L sciatic nerve flossing on/off x20    L HSS x30 sec        Prone-L knee flexion/extension x6, x4 Prone-L knee flexion/extension w strap x20        CRISTA x5 min-HEP CRISTA x5 min        SKYLAR x10 R

## 2019-07-05 DIAGNOSIS — Z13.5 SCREENING FOR DIABETIC RETINOPATHY: Primary | ICD-10-CM

## 2019-07-22 DIAGNOSIS — E78.00 HYPERCHOLESTEROLEMIA: ICD-10-CM

## 2019-07-24 RX ORDER — FENOFIBRIC ACID 135 MG/1
CAPSULE, DELAYED RELEASE ORAL
Qty: 90 CAPSULE | Refills: 0 | Status: SHIPPED | OUTPATIENT
Start: 2019-07-24 | End: 2021-04-07

## 2019-07-24 NOTE — TELEPHONE ENCOUNTER
LOV 5/3/19- acute, last physical was 11/28/18.  Request from [facility] for Fenofibrate  Last labs noted in telephone encounter on 4/24/19 and advised to continue med  Refill done per protocol

## 2019-07-30 NOTE — PROGRESS NOTES
[facility] Progress Note      Patient Name:  Stephanie Vieira  YOB: 1960  Medical Record Number:  [id number]    Date of visit:  7/31/2019    CHIEF COMPLAINT: H/o ALL.     HPI:     62year old that I saw as a new patient in 5/18 afte (hip pain). Disp: 60 tablet Rfl: 0   Omega-3-acid Ethyl Esters 1 g Oral Cap Take 1 g by mouth daily.  Disp:  Rfl:    ALPRAZolam 0.5 MG Oral Tab TAKE 1 TABLET BY MOUTH EVERY NIGHT AT BEDTIME AS NEEDED FOR SLEEP Disp: 30 tablet Rfl: 1   nystatin 546236 UNIT/G mmol/L    Chloride 107 98 - 112 mmol/L    CO2 25.0 21.0 - 32.0 mmol/L    Anion Gap 8 0 - 18 mmol/L    BUN 12 7 - 18 mg/dL    Creatinine 0.81 0.55 - 1.02 mg/dL    BUN/CREA Ratio 14.8 10.0 - 20.0    Calcium, Total 9.6 8.5 - 10.1 mg/dL    Calculated Osmolalit stable. Continue to follow. Requests flow cytometry, can do annually. # Anxiety: supportive care. #  Abn mammo: Mammogram 11/17-nodular density left side. Biopsy 12/17-benign tissue.   Overdue for mammogram.  .    # Microcytosis: Hb electrophoresis

## 2019-07-31 ENCOUNTER — TELEPHONE (OUTPATIENT)
Dept: HEMATOLOGY/ONCOLOGY | Facility: HOSPITAL | Age: 59
End: 2019-07-31

## 2019-07-31 ENCOUNTER — OFFICE VISIT (OUTPATIENT)
Dept: HEMATOLOGY/ONCOLOGY | Facility: HOSPITAL | Age: 59
End: 2019-07-31
Attending: INTERNAL MEDICINE
Payer: COMMERCIAL

## 2019-07-31 VITALS
RESPIRATION RATE: 20 BRPM | SYSTOLIC BLOOD PRESSURE: 134 MMHG | WEIGHT: 168.19 LBS | TEMPERATURE: 97 F | BODY MASS INDEX: 29.8 KG/M2 | OXYGEN SATURATION: 97 % | DIASTOLIC BLOOD PRESSURE: 80 MMHG | HEIGHT: 62.99 IN | HEART RATE: 67 BPM

## 2019-07-31 DIAGNOSIS — C91.01 ACUTE LYMPHOCYTIC LEUKEMIA IN REMISSION (HCC): ICD-10-CM

## 2019-07-31 DIAGNOSIS — R71.8 MICROCYTOSIS: ICD-10-CM

## 2019-07-31 DIAGNOSIS — D56.3 BETA THALASSEMIA TRAIT: Primary | ICD-10-CM

## 2019-07-31 DIAGNOSIS — M25.562 PAIN IN LATERAL PORTION OF LEFT KNEE: ICD-10-CM

## 2019-07-31 LAB
ALBUMIN SERPL-MCNC: 4.2 G/DL (ref 3.4–5)
ALBUMIN/GLOB SERPL: 1.2 {RATIO} (ref 1–2)
ALP LIVER SERPL-CCNC: 56 U/L (ref 46–118)
ALT SERPL-CCNC: 40 U/L (ref 13–56)
ANION GAP SERPL CALC-SCNC: 8 MMOL/L (ref 0–18)
AST SERPL-CCNC: 29 U/L (ref 15–37)
BASOPHILS # BLD AUTO: 0.04 X10(3) UL (ref 0–0.2)
BASOPHILS NFR BLD AUTO: 0.5 %
BILIRUB SERPL-MCNC: 0.6 MG/DL (ref 0.1–2)
BUN BLD-MCNC: 12 MG/DL (ref 7–18)
BUN/CREAT SERPL: 14.8 (ref 10–20)
CALCIUM BLD-MCNC: 9.6 MG/DL (ref 8.5–10.1)
CHLORIDE SERPL-SCNC: 107 MMOL/L (ref 98–112)
CO2 SERPL-SCNC: 25 MMOL/L (ref 21–32)
CREAT BLD-MCNC: 0.81 MG/DL (ref 0.55–1.02)
DEPRECATED RDW RBC AUTO: 35.9 FL (ref 35.1–46.3)
EOSINOPHIL # BLD AUTO: 0.1 X10(3) UL (ref 0–0.7)
EOSINOPHIL NFR BLD AUTO: 1.2 %
ERYTHROCYTE [DISTWIDTH] IN BLOOD BY AUTOMATED COUNT: 18.3 % (ref 11–15)
GLOBULIN PLAS-MCNC: 3.5 G/DL (ref 2.8–4.4)
GLUCOSE BLD-MCNC: 98 MG/DL (ref 70–99)
HCT VFR BLD AUTO: 37.8 % (ref 35–48)
HGB BLD-MCNC: 11.4 G/DL (ref 12–16)
IMM GRANULOCYTES # BLD AUTO: 0.03 X10(3) UL (ref 0–1)
IMM GRANULOCYTES NFR BLD: 0.4 %
LDH SERPL L TO P-CCNC: 137 U/L (ref 84–246)
LYMPHOCYTES # BLD AUTO: 3.05 X10(3) UL (ref 1–4)
LYMPHOCYTES NFR BLD AUTO: 37.3 %
M PROTEIN MFR SERPL ELPH: 7.7 G/DL (ref 6.4–8.2)
MCH RBC QN AUTO: 18.9 PG (ref 26–34)
MCHC RBC AUTO-ENTMCNC: 30.2 G/DL (ref 31–37)
MCV RBC AUTO: 62.7 FL (ref 80–100)
MONOCYTES # BLD AUTO: 0.53 X10(3) UL (ref 0.1–1)
MONOCYTES NFR BLD AUTO: 6.5 %
NEUTROPHILS # BLD AUTO: 4.42 X10 (3) UL (ref 1.5–7.7)
NEUTROPHILS # BLD AUTO: 4.42 X10(3) UL (ref 1.5–7.7)
NEUTROPHILS NFR BLD AUTO: 54.1 %
OSMOLALITY SERPL CALC.SUM OF ELEC: 290 MOSM/KG (ref 275–295)
PLATELET # BLD AUTO: 264 10(3)UL (ref 150–450)
POTASSIUM SERPL-SCNC: 4 MMOL/L (ref 3.5–5.1)
RBC # BLD AUTO: 6.03 X10(6)UL (ref 3.8–5.3)
SODIUM SERPL-SCNC: 140 MMOL/L (ref 136–145)
WBC # BLD AUTO: 8.2 X10(3) UL (ref 4–11)

## 2019-07-31 PROCEDURE — 99214 OFFICE O/P EST MOD 30 MIN: CPT | Performed by: INTERNAL MEDICINE

## 2019-07-31 NOTE — PROGRESS NOTES
Outpatient Oncology Care Plan  Problem list:  pain  knowledge deficit  anxiety    Problems related to:    disease/disease progression    Interventions:  provided general teaching    Expected outcomes:  understands plan of care    Progress towards outcome:

## 2019-07-31 NOTE — TELEPHONE ENCOUNTER
Arlene Boas, MD  [address]             Call, labs stable      Spoke to pt, verbalized understanding.

## 2019-09-21 ENCOUNTER — TELEPHONE (OUTPATIENT)
Dept: FAMILY MEDICINE CLINIC | Facility: CLINIC | Age: 59
End: 2019-09-21

## 2019-09-21 DIAGNOSIS — E11.9 DIET-CONTROLLED TYPE 2 DIABETES MELLITUS (HCC): Primary | Chronic | ICD-10-CM

## 2019-09-21 DIAGNOSIS — E03.9 ACQUIRED HYPOTHYROIDISM: ICD-10-CM

## 2019-09-27 RX ORDER — LEVOTHYROXINE SODIUM 0.07 MG/1
TABLET ORAL
Qty: 90 TABLET | Refills: 0 | Status: SHIPPED | OUTPATIENT
Start: 2019-09-27 | End: 2021-04-07

## 2019-09-27 NOTE — TELEPHONE ENCOUNTER
Pt lov 5/3/19, thryoid lab protocol not met,  Is it ok to refill levothyroxin 75?  Please advise, routed to Dr Bell Severe

## 2019-09-27 NOTE — TELEPHONE ENCOUNTER
Diagnoses and all orders for this visit:    Diet-controlled type 2 diabetes mellitus ([facility] [address]  -     HEMOGLOBIN A1C  -     COMP METABOLIC PANEL (14)  -     LIPID PANEL  -     MICROALB/CREAT RATIO, RANDOM URINE    Acquired hypothyroidism  -     LEVOTHYROXINE S

## 2019-11-15 ENCOUNTER — HOSPITAL ENCOUNTER (OUTPATIENT)
Dept: MAMMOGRAPHY | Age: 59
Discharge: HOME | End: 2019-11-15
Attending: FAMILY MEDICINE
Payer: COMMERCIAL

## 2019-11-15 DIAGNOSIS — Z12.31 VISIT FOR SCREENING MAMMOGRAM: ICD-10-CM

## 2019-11-15 PROCEDURE — 77067 SCR MAMMO BI INCL CAD: CPT | Performed by: FAMILY MEDICINE

## 2019-11-15 PROCEDURE — 77063 BREAST TOMOSYNTHESIS BI: CPT | Performed by: FAMILY MEDICINE

## 2020-01-05 DIAGNOSIS — E03.9 ACQUIRED HYPOTHYROIDISM: ICD-10-CM

## 2020-01-06 RX ORDER — LEVOTHYROXINE SODIUM 0.07 MG/1
TABLET ORAL
Qty: 90 TABLET | Refills: 0 | Status: SHIPPED | OUTPATIENT
Start: 2020-01-06 | End: 2021-04-07

## 2020-01-06 NOTE — TELEPHONE ENCOUNTER
Pt lov 5/3/19, no upcoming visit scheduled, labs over due last tsh 5/9/19 was 1.6. Is it ok to refill levothyroxin? Please advise.  Routed to Dr Fransico Cruz

## 2020-01-07 ENCOUNTER — TELEPHONE (OUTPATIENT)
Dept: FAMILY MEDICINE CLINIC | Facility: CLINIC | Age: 60
End: 2020-01-07

## 2020-01-07 DIAGNOSIS — E78.2 MIXED HYPERLIPIDEMIA: ICD-10-CM

## 2020-01-08 RX ORDER — SIMVASTATIN 40 MG
TABLET ORAL
Qty: 90 TABLET | Refills: 1 | Status: SHIPPED | OUTPATIENT
Start: 2020-01-08 | End: 2021-04-07

## 2020-01-08 NOTE — TELEPHONE ENCOUNTER
Patient is leaving to [facility] tomorrow (sister is not doing well)  Patient stated when she returns she will schedule an appointment in February. (unsure on what date at this time)  Patient wants to know if medication can be sent to pharmacy today.

## 2020-01-08 NOTE — TELEPHONE ENCOUNTER
Requested Prescriptions     Pending Prescriptions Disp Refills   • SIMVASTATIN 40 MG Oral Tab [Pharmacy Med Name: SIMVASTATIN 40MG TABLETS] 90 tablet 3     Sig: TAKE 1 TABLET(40 MG) BY MOUTH EVERY NIGHT     LOV 5/3/2019     Appointment scheduled: Visit leona

## 2020-02-11 ENCOUNTER — PATIENT OUTREACH (OUTPATIENT)
Dept: FAMILY MEDICINE CLINIC | Facility: CLINIC | Age: 60
End: 2020-02-11

## 2020-10-09 LAB
% SATURATION: 23 % (CALC) (ref 11–50)
ALBUMIN/GLOB SERPL: 1.7 (CALC) (ref 1–2.5)
ALBUMIN/GLOB SERPL: 1.8 (CALC) (ref 1–2.5)
ALBUMIN: 4.4 G/DL (ref 3.6–5.1)
ALBUMIN: 4.5 G/DL (ref 3.6–5.1)
ALKALINE PHOSPHATASE: 52 UNIT/L (ref 33–130)
ALKALINE PHOSPHATASE: 70 UNIT/L (ref 33–130)
ALT: 22 UNIT/L (ref 6–29)
ALT: 27 UNIT/L (ref 6–29)
AST: 21 UNIT/L (ref 10–35)
AST: 25 UNIT/L (ref 10–35)
BASO%: 0.2 %
BASO%: 0.2 %
BASO: 0 10^3/UL
BASO: 0 10^3/UL
BILIRUBIN, TOTAL: 0.4 MG/DL (ref 0.2–1.2)
BILIRUBIN, TOTAL: 0.5 MG/DL (ref 0.2–1.2)
BUN/CREATININE RATIO: NORMAL (CALC) (ref 6–22)
BUN/CREATININE RATIO: NORMAL (CALC) (ref 6–22)
CALCIUM: 10.2 MG/DL (ref 8.6–10.4)
CALCIUM: 9.8 MG/DL (ref 8.6–10.4)
CARBON DIOXIDE: 23 MMOL/L (ref 20–31)
CARBON DIOXIDE: 26 MMOL/L (ref 20–31)
CHLORIDE: 102 MMOL/L (ref 98–110)
CHLORIDE: 103 MMOL/L (ref 98–110)
CRCL (C&G) (MOSAIQ HL): 109.43 ML/MIN
CRCL (C&G) (MOSAIQ HL): 113.12 ML/MIN
CREATININE CLEARANCE (MOSAIQ HL): 91.6 ML/MIN
CREATININE CLEARANCE (MOSAIQ HL): 93 ML/MIN
CREATININE: 0.67 MG/DL (ref 0.5–1.05)
CREATININE: 0.68 MG/DL (ref 0.5–1.05)
EGFR AFRICAN AMERICAN: 113 ML/MIN/1.73M2
EGFR AFRICAN AMERICAN: 114 ML/MIN/1.73M2
EGFR NON-AFR. AMERICAN: 98 ML/MIN/1.73M2
EGFR NON-AFR. AMERICAN: 98 ML/MIN/1.73M2
EOS%: 1.3 %
EOS%: 1.6 %
EOS: 0.1 10^3/UL
EOS: 0.1 10^3/UL
FERRITIN: 100 NG/ML (ref 10–232)
GLOBULIN: 2.4 G/DL (CALC) (ref 1.9–3.7)
GLOBULIN: 2.6 G/DL (CALC) (ref 1.9–3.7)
GLUCOSE: 92 MG/DL (ref 65–99)
GLUCOSE: 97 MG/DL (ref 65–99)
HCT: 33.5 % (ref 38–54)
HCT: 34.1 % (ref 38–54)
HEMOGLOBIN A1C: 6.8 % OF TOTAL HGB
HGB: 10.9 G/DL (ref 12–18)
HGB: 11 G/DL (ref 12–18)
IRON BINDING CAPACITY: 447 MCG/DL (CALC) (ref 250–450)
IRON, TOTAL: 103 MCG/DL (ref 45–160)
LD: 112 UNIT/L (ref 120–250)
LYMPH%: 35.2 % (ref 12–44)
LYMPH%: 41.6 % (ref 12–44)
LYMPH: 2.8 10^3/UL (ref 0.8–2.8)
LYMPH: 3.5 10^3/UL (ref 0.8–2.8)
MCH: 19.3 PG (ref 26–33)
MCH: 19.6 PG (ref 26–33)
MCHC: 32.3 G/DL (ref 31–36)
MCHC: 32.5 G/DL (ref 31–36)
MCV: 59.9 FML (ref 82–100)
MCV: 60.4 FML (ref 82–100)
MONO%: 5.9 % (ref 2–12)
MONO%: 7.2 % (ref 2–12)
MONO: 0.5 10^3/UL (ref 0.2–1)
MONO: 0.6 10^3/UL (ref 0.2–1)
MPV: 10.5 FML (ref 8.6–11.7)
MPV: 10.9 FML (ref 8.6–11.7)
NEUT%: 51 % (ref 47–76)
NEUT%: 55.8 % (ref 47–76)
NEUT: 4.3 10^3/UL (ref 1.5–7.1)
NEUT: 4.5 10^3/UL (ref 1.5–7.1)
PLT: 238 10^3/UL (ref 150–375)
PLT: 267 10^3/UL (ref 150–375)
POTASSIUM: 3.8 MMOL/L (ref 3.5–5.3)
POTASSIUM: 4.1 MMOL/L (ref 3.5–5.3)
PROTEIN, TOTAL: 6.8 G/DL (ref 6.1–8.1)
PROTEIN, TOTAL: 7.1 G/DL (ref 6.1–8.1)
RBC: 5.55 10^6/UL (ref 4.2–6.2)
RBC: 5.69 10^6/UL (ref 4.2–6.2)
RDW-CV: 15.9 %
RDW-CV: 16.9 %
RDW-SD: 34.6 FML (ref 36–50)
RDW-SD: 35.4 FML (ref 36–50)
SODIUM: 138 MMOL/L (ref 135–146)
SODIUM: 140 MMOL/L (ref 135–146)
TSH, 3RD GENERATION: 3.78 MIU/L (ref 0.4–4.5)
UREA NITROGEN (BUN): 13 MG/DL (ref 7–25)
UREA NITROGEN (BUN): 14 MG/DL (ref 7–25)
WBC: 8 10^3/UL (ref 4.3–11)
WBC: 8.4 10^3/UL (ref 4.3–11)

## 2020-10-11 VITALS
SYSTOLIC BLOOD PRESSURE: 128 MMHG | BODY MASS INDEX: 28.88 KG/M2 | HEIGHT: 63 IN | DIASTOLIC BLOOD PRESSURE: 70 MMHG | WEIGHT: 163.01 LBS

## 2020-10-11 VITALS — WEIGHT: 171.01 LBS | SYSTOLIC BLOOD PRESSURE: 130 MMHG | DIASTOLIC BLOOD PRESSURE: 82 MMHG

## 2021-03-20 DIAGNOSIS — Z23 NEED FOR VACCINATION: ICD-10-CM

## 2021-03-30 ENCOUNTER — TELEPHONE (OUTPATIENT)
Dept: FAMILY MEDICINE CLINIC | Facility: CLINIC | Age: 61
End: 2021-03-30

## 2021-03-30 DIAGNOSIS — Z11.59 ENCOUNTER FOR HEPATITIS C SCREENING TEST FOR LOW RISK PATIENT: ICD-10-CM

## 2021-03-30 DIAGNOSIS — Z00.00 LABORATORY EXAMINATION ORDERED AS PART OF A ROUTINE GENERAL MEDICAL EXAMINATION: ICD-10-CM

## 2021-03-30 DIAGNOSIS — E78.2 MIXED HYPERLIPIDEMIA: ICD-10-CM

## 2021-03-30 DIAGNOSIS — C91.01 ACUTE LYMPHOCYTIC LEUKEMIA IN REMISSION (HCC): ICD-10-CM

## 2021-03-30 DIAGNOSIS — Z12.31 VISIT FOR SCREENING MAMMOGRAM: ICD-10-CM

## 2021-03-30 DIAGNOSIS — R73.9 HYPERGLYCEMIA: ICD-10-CM

## 2021-03-30 DIAGNOSIS — E11.9 DIET-CONTROLLED TYPE 2 DIABETES MELLITUS (HCC): ICD-10-CM

## 2021-03-30 DIAGNOSIS — E03.9 ACQUIRED HYPOTHYROIDISM: Primary | ICD-10-CM

## 2021-03-30 NOTE — TELEPHONE ENCOUNTER
Please enter lab orders for the patient's upcoming physical appointment. Physical scheduled:    Your appointments     Date & Time Appointment Department [facility])    Apr 07, 2021  9:00 AM CDT Physical - Established with Greer Agudelo  [facility]

## 2021-03-30 NOTE — TELEPHONE ENCOUNTER
Diagnoses and all orders for this visit:    Acquired hypothyroidism  -     FREE T4 (FREE THYROXINE)  -     ASSAY, THYROID STIM HORMONE    [facility]  -     COMP METABOLIC PANEL (14)  -     LIPID PANEL  -     HEMOGLOBIN A

## 2021-04-06 NOTE — ASSESSMENT & PLAN NOTE
Stable, Continue present management.     Cholesterol Lowering Medications          SIMVASTATIN 40 MG Oral Tab    FENOFIBRIC ACID 135 MG Oral Capsule Delayed Release    Omega-3-acid Ethyl Esters 1 g Oral Cap

## 2021-04-06 NOTE — ASSESSMENT & PLAN NOTE
Stable, Continue present management.     Thyroid  (most recent labs)   Lab Results   Component Value Date/Time    TSH 1.600 05/09/2018 02:49 PM    T4F 1.2 05/09/2018 02:49 PM         Endocrine Medications          LEVOTHYROXINE SODIUM 75 MCG Oral Tab    LEV

## 2021-04-07 ENCOUNTER — OFFICE VISIT (OUTPATIENT)
Dept: FAMILY MEDICINE CLINIC | Facility: CLINIC | Age: 61
End: 2021-04-07
Payer: COMMERCIAL

## 2021-04-07 VITALS
HEART RATE: 70 BPM | RESPIRATION RATE: 17 BRPM | BODY MASS INDEX: 28 KG/M2 | TEMPERATURE: 98 F | DIASTOLIC BLOOD PRESSURE: 70 MMHG | WEIGHT: 158 LBS | SYSTOLIC BLOOD PRESSURE: 138 MMHG | HEIGHT: 63 IN

## 2021-04-07 DIAGNOSIS — Z12.31 VISIT FOR SCREENING MAMMOGRAM: ICD-10-CM

## 2021-04-07 DIAGNOSIS — C91.01 ACUTE LYMPHOCYTIC LEUKEMIA IN REMISSION (HCC): ICD-10-CM

## 2021-04-07 DIAGNOSIS — E11.9 DIET-CONTROLLED TYPE 2 DIABETES MELLITUS (HCC): Chronic | ICD-10-CM

## 2021-04-07 DIAGNOSIS — Z00.00 ANNUAL PHYSICAL EXAM: Primary | ICD-10-CM

## 2021-04-07 DIAGNOSIS — M81.0 AGE-RELATED OSTEOPOROSIS WITHOUT CURRENT PATHOLOGICAL FRACTURE: ICD-10-CM

## 2021-04-07 DIAGNOSIS — E78.00 HYPERCHOLESTEROLEMIA: ICD-10-CM

## 2021-04-07 DIAGNOSIS — D56.1 BETA-THALASSEMIA (HCC): ICD-10-CM

## 2021-04-07 DIAGNOSIS — E78.2 MIXED HYPERLIPIDEMIA: ICD-10-CM

## 2021-04-07 DIAGNOSIS — E03.9 ACQUIRED HYPOTHYROIDISM: ICD-10-CM

## 2021-04-07 PROCEDURE — 3008F BODY MASS INDEX DOCD: CPT | Performed by: FAMILY MEDICINE

## 2021-04-07 PROCEDURE — 3075F SYST BP GE 130 - 139MM HG: CPT | Performed by: FAMILY MEDICINE

## 2021-04-07 PROCEDURE — 99396 PREV VISIT EST AGE 40-64: CPT | Performed by: FAMILY MEDICINE

## 2021-04-07 PROCEDURE — 3078F DIAST BP <80 MM HG: CPT | Performed by: FAMILY MEDICINE

## 2021-04-07 RX ORDER — FENOFIBRIC ACID 135 MG/1
1 CAPSULE, DELAYED RELEASE ORAL DAILY
Qty: 90 CAPSULE | Refills: 3 | Status: SHIPPED | OUTPATIENT
Start: 2021-04-07

## 2021-04-07 RX ORDER — RAMIPRIL 5 MG/1
5 CAPSULE ORAL DAILY
COMMUNITY
End: 2021-04-07

## 2021-04-07 RX ORDER — LEVOTHYROXINE SODIUM 0.07 MG/1
75 TABLET ORAL
Qty: 90 TABLET | Refills: 3 | Status: SHIPPED | OUTPATIENT
Start: 2021-04-07

## 2021-04-07 RX ORDER — RAMIPRIL 5 MG/1
5 CAPSULE ORAL DAILY
Qty: 90 CAPSULE | Refills: 3 | Status: SHIPPED | OUTPATIENT
Start: 2021-04-07

## 2021-04-07 RX ORDER — SIMVASTATIN 40 MG
40 TABLET ORAL NIGHTLY
Qty: 90 TABLET | Refills: 3 | Status: SHIPPED | OUTPATIENT
Start: 2021-04-07

## 2021-04-07 NOTE — PATIENT INSTRUCTIONS
Susu Finch, and [address] [facility], [address] [facility]  [address] [place] [address] Sabine Lepe, Nataliya Berry, and [facility]  [address],   [address] [place] [address],[address] [place] [address] [place] [address],

## 2021-04-07 NOTE — PROGRESS NOTES
Senait Yoo is a 61year old female who presents for a complete physical exam.     had concerns including Physical (WWE No pap. ).    Her last annual assessment has been over 1 year: Annual Physical due on 11/28/2019       HPI/Subjective:     Symptoms: 11/16/2016  Diabetes Care A1C due on 05/28/2019  LDL Control due on 10/27/2019  Annual Depression Screen due on 11/28/2019  Annual Physical due on 11/28/2019  Diabetes Care Nephropathy Screening due on 11/28/2019  Pap Smear,3 Years due on 09/14/2020  Mammo file      Number of children: Not on file      Years of education: Not on file      Highest education level: Not on file    Occupational History      Occupation: Homemaker     Tobacco Use      Smoking status: Never Smoker      Smokeless tobacco: Never Used not in acute distress. Appearance: Normal appearance. HENT:      Head: Normocephalic and atraumatic.       Right Ear: Tympanic membrane and external ear normal.      Left Ear: Tympanic membrane and external ear normal.      Nose: Nose normal.      [employer] maintenance, Up to date    Immunizations: Up to date   Immunization History   Administered Date(s) Administered   • Covid-19 Vaccine Pfizer 30 mcg/0.3 ml 02/27/2021, 03/19/2021   • FLU VAC QIV SPLIT 3 YRS AND OLDER (79965) 10/29/2019   • FLUZONE 6 10/27/2018                   Relevant Medications    metFORMIN HCl 500 MG Oral Tab       Musculoskeletal    Osteoporosis    Overview     Dexa 8.2010 and 8.2012 showed T score -1.5         Current Assessment & Plan     Stable continue present management

## 2021-04-09 RX ORDER — ALPRAZOLAM 0.5 MG/1
TABLET ORAL
Qty: 30 TABLET | Refills: 1 | Status: SHIPPED | OUTPATIENT
Start: 2021-04-09

## 2021-04-09 NOTE — TELEPHONE ENCOUNTER
Patient was seen 04/07/21 and forgot to request refill on her Alprazolam, please send to [facility]

## 2021-04-12 ENCOUNTER — HOSPITAL ENCOUNTER (OUTPATIENT)
Dept: MAMMOGRAPHY | Age: 61
Discharge: HOME | End: 2021-04-12
Attending: FAMILY MEDICINE
Payer: COMMERCIAL

## 2021-04-12 DIAGNOSIS — Z12.31 VISIT FOR SCREENING MAMMOGRAM: ICD-10-CM

## 2021-04-12 PROCEDURE — 77063 BREAST TOMOSYNTHESIS BI: CPT | Performed by: FAMILY MEDICINE

## 2021-04-12 PROCEDURE — 77067 SCR MAMMO BI INCL CAD: CPT | Performed by: FAMILY MEDICINE

## 2021-04-19 ENCOUNTER — TELEPHONE (OUTPATIENT)
Dept: FAMILY MEDICINE CLINIC | Facility: CLINIC | Age: 61
End: 2021-04-19

## 2021-04-19 NOTE — TELEPHONE ENCOUNTER
Pt requesting a call back on her mammogram and lab results. Did not see that labs were resulted yet but mammogram was.

## 2021-04-19 NOTE — TELEPHONE ENCOUNTER
Spoke with Pt- Gave Nomal mammogram results  Pt states had labs done at [address] [place] [address].   Advised Pt to have them mail or fax those results to our office or Pt can get results and drop off copy here

## 2021-05-05 ENCOUNTER — TELEPHONE (OUTPATIENT)
Dept: FAMILY MEDICINE CLINIC | Facility: CLINIC | Age: 61
End: 2021-05-05

## 2021-05-05 NOTE — TELEPHONE ENCOUNTER
And [facility] health lab results. Glucose 68 but CMP was otherwise normal.  Lipids showed total 157, triglycerides 79, HDL 55, LDL 86. Vitamin D was 58.3. Hemoglobin 10.8 with an MCV of 63. TSH 3.03.   Absolute lymphocyte count 3.9, A1c 6.1%    Ok to notify an

## 2021-05-05 NOTE — TELEPHONE ENCOUNTER
Pt dropped off her lab test results from [facility]. Requesting a call back once reviewed.  Results in Dr Carlos Woodruff

## 2021-06-01 NOTE — PROGRESS NOTES
[facility] Progress Note      Patient Name:  Xiao Garner  YOB: 1960  Medical Record Number:  [id number]    Date of visit: 6/2/2021    CHIEF COMPLAINT: H/o ALL.     HPI:     61year old that I saw as a new patient in 5/18 tablet 3   • ramipril 5 MG Oral Cap Take 1 capsule (5 mg total) by mouth daily. 90 capsule 3   • simvastatin 40 MG Oral Tab Take 1 tablet (40 mg total) by mouth nightly.  90 tablet 3   • Levothyroxine Sodium 75 MCG Oral Tab Take 1 tablet (75 mcg total) by m 2002-elevated hemoglobin A2, suggestive of beta thalassemia trait.     ORDERS PLACED:        CBC With Differential With Platelet      Comp Metabolic Panel (14)      LDH      Uric Acid, Serum      Leukemia/Lymphoma Pheotype      VITAMIN D, 25-HYDROXY [E]

## 2021-06-02 ENCOUNTER — OFFICE VISIT (OUTPATIENT)
Dept: HEMATOLOGY/ONCOLOGY | Facility: HOSPITAL | Age: 61
End: 2021-06-02
Attending: INTERNAL MEDICINE
Payer: COMMERCIAL

## 2021-06-02 VITALS
HEART RATE: 71 BPM | OXYGEN SATURATION: 97 % | DIASTOLIC BLOOD PRESSURE: 82 MMHG | SYSTOLIC BLOOD PRESSURE: 134 MMHG | HEIGHT: 62.99 IN | WEIGHT: 159 LBS | TEMPERATURE: 99 F | BODY MASS INDEX: 28.17 KG/M2 | RESPIRATION RATE: 16 BRPM

## 2021-06-02 DIAGNOSIS — R71.8 MICROCYTOSIS: ICD-10-CM

## 2021-06-02 DIAGNOSIS — D64.9 ANEMIA, UNSPECIFIED TYPE: ICD-10-CM

## 2021-06-02 DIAGNOSIS — C91.01 ACUTE LYMPHOCYTIC LEUKEMIA IN REMISSION (HCC): Primary | ICD-10-CM

## 2021-06-02 PROCEDURE — 99213 OFFICE O/P EST LOW 20 MIN: CPT | Performed by: INTERNAL MEDICINE

## 2021-06-02 NOTE — PROGRESS NOTES
Routine MD follow up. Feeling well, reports some low abd pain when she lies down. Recommended to see gyne and eye MD by PCP.    Education Record    Learner:  Patient    [facility],     Barriers / Limitations:  None   Comments:    ZANA

## 2021-06-04 ENCOUNTER — TELEPHONE (OUTPATIENT)
Dept: HEMATOLOGY/ONCOLOGY | Facility: HOSPITAL | Age: 61
End: 2021-06-04

## 2021-06-04 DIAGNOSIS — D64.9 ANEMIA, UNSPECIFIED TYPE: Primary | ICD-10-CM

## 2021-06-04 NOTE — TELEPHONE ENCOUNTER
Patient saw hemoglobin was low on MyChart and she would like to know if she need to take Iron or what's the treatment plan, please call to discuss.

## 2021-06-04 NOTE — TELEPHONE ENCOUNTER
Returned call and let her know Dr Mary Ellen Siddiqui recommends iron supplement, OTC every other day. Reviewed that she may need to take a stool softener due to constipation. Can recheck labs in 3-6 months. Pt verbalized understanding.

## 2021-07-08 ENCOUNTER — OFFICE VISIT (OUTPATIENT)
Dept: OBGYN CLINIC | Facility: CLINIC | Age: 61
End: 2021-07-08
Payer: COMMERCIAL

## 2021-07-08 VITALS
HEIGHT: 62.99 IN | WEIGHT: 156.38 LBS | DIASTOLIC BLOOD PRESSURE: 74 MMHG | BODY MASS INDEX: 27.71 KG/M2 | SYSTOLIC BLOOD PRESSURE: 122 MMHG

## 2021-07-08 DIAGNOSIS — Z01.419 WELL WOMAN EXAM WITH ROUTINE GYNECOLOGICAL EXAM: Primary | ICD-10-CM

## 2021-07-08 DIAGNOSIS — Z12.4 CERVICAL CANCER SCREENING: ICD-10-CM

## 2021-07-08 PROCEDURE — 99386 PREV VISIT NEW AGE 40-64: CPT | Performed by: OBSTETRICS & GYNECOLOGY

## 2021-07-08 PROCEDURE — 3074F SYST BP LT 130 MM HG: CPT | Performed by: OBSTETRICS & GYNECOLOGY

## 2021-07-08 PROCEDURE — 3008F BODY MASS INDEX DOCD: CPT | Performed by: OBSTETRICS & GYNECOLOGY

## 2021-07-08 PROCEDURE — 87624 HPV HI-RISK TYP POOLED RSLT: CPT | Performed by: OBSTETRICS & GYNECOLOGY

## 2021-07-08 PROCEDURE — 3078F DIAST BP <80 MM HG: CPT | Performed by: OBSTETRICS & GYNECOLOGY

## 2021-07-08 NOTE — PROGRESS NOTES
OB/GYN H&P       CC: Patient presents with:  Pap: only pap smear, mammogram already done       HPI: Senait Yoo is a 61year old  here for WWE/PAP  Her PCP ordered Mammogram - normal   No issues      At the end of the visit, on her way out, pt s pubovaginal sling,cystoscopy       Vancomycin              RASH  ALPRAZolam 0.5 MG Oral Tab, TAKE 1 TABLET BY MOUTH EVERY NIGHT AT BEDTIME AS NEEDED FOR SLEEP, Disp: 30 tablet, Rfl: 1  metFORMIN HCl 500 MG Oral Tab, Take 1 tablet (500 mg total) by mouth Thyroid: No thyromegaly. Cardiovascular:      Rate and Rhythm: Normal rate. Pulmonary:      Effort: Pulmonary effort is normal.   Abdominal:      General: There is no distension. Palpations: Abdomen is soft. There is no mass. Tenderness:  Ther

## 2021-07-15 LAB — HPV I/H RISK 1 DNA SPEC QL NAA+PROBE: NEGATIVE

## 2021-09-01 ENCOUNTER — TELEPHONE (OUTPATIENT)
Dept: FAMILY MEDICINE CLINIC | Facility: CLINIC | Age: 61
End: 2021-09-01

## 2021-09-01 NOTE — TELEPHONE ENCOUNTER
Patient is calling she had the Shingles shot and has had body aches, fever and started taking tylenol it helped with the pain made it a bit better. She was really down. Today she is a little better and took tylenol in the  Am so not sure if has any fever.

## 2021-09-01 NOTE — TELEPHONE ENCOUNTER
Renetta got her first Shingrix vaccine at [facility] Monday 8/30/2021. Monday evening she developed chills and fever around 9:30p.m. She had difficulty sleeping and took tylenol.  The chills and fever continued through yesterday and she was also feeling dizzy

## 2021-10-19 ENCOUNTER — IMMUNIZATION (OUTPATIENT)
Dept: LAB | Facility: HOSPITAL | Age: 61
End: 2021-10-19
Attending: EMERGENCY MEDICINE
Payer: COMMERCIAL

## 2021-10-19 DIAGNOSIS — Z23 NEED FOR VACCINATION: Primary | ICD-10-CM

## 2021-10-19 PROCEDURE — 0003A SARSCOV2 VAC 30MCG/0.3ML IM: CPT

## 2022-01-08 PROCEDURE — 3044F HG A1C LEVEL LT 7.0%: CPT | Performed by: FAMILY MEDICINE

## 2022-01-26 NOTE — PROGRESS NOTES
No [facility] Progress Note      Patient Name:  Talib Lopez  YOB: 1960  Medical Record Number:  [id number]    Date of visit: 1/28/2022    CHIEF COMPLAINT: H/o ALL.     HPI:     64year old that I saw as a new patient in 5/18 metFORMIN HCl 500 MG Oral Tab Take 1 tablet (500 mg total) by mouth daily. 90 tablet 3   • ramipril 5 MG Oral Cap Take 1 capsule (5 mg total) by mouth daily. 90 capsule 3   • simvastatin 40 MG Oral Tab Take 1 tablet (40 mg total) by mouth nightly.  90 table Capacity 556 (H) 240 - 450 ug/dL    % Saturation 21 15 - 50 %   FERRITIN    Collection Time: 01/28/22  9:30 AM   Result Value Ref Range    Ferritin 111.6 18.0 - 340.0 ng/mL   CBC W/ DIFFERENTIAL    Collection Time: 01/28/22  9:30 AM   Result Value Ref Ange Vargas Labs today appear stable. Continue to follow. Requests flow cytometry, can do annually. # Anemia: Hb better, continue iron. # Microcytosis: Hb electrophoresis 2002-elevated hemoglobin A2, suggestive of beta thalassemia trait.     # Hypercalcemia: ho

## 2022-01-28 ENCOUNTER — TELEPHONE (OUTPATIENT)
Dept: HEMATOLOGY/ONCOLOGY | Facility: HOSPITAL | Age: 62
End: 2022-01-28

## 2022-01-28 ENCOUNTER — OFFICE VISIT (OUTPATIENT)
Dept: HEMATOLOGY/ONCOLOGY | Facility: HOSPITAL | Age: 62
End: 2022-01-28
Attending: INTERNAL MEDICINE
Payer: COMMERCIAL

## 2022-01-28 VITALS
BODY MASS INDEX: 28 KG/M2 | HEART RATE: 68 BPM | WEIGHT: 158 LBS | SYSTOLIC BLOOD PRESSURE: 163 MMHG | HEIGHT: 62.99 IN | DIASTOLIC BLOOD PRESSURE: 83 MMHG | TEMPERATURE: 98 F | RESPIRATION RATE: 16 BRPM | OXYGEN SATURATION: 100 %

## 2022-01-28 DIAGNOSIS — C91.01 ACUTE LYMPHOCYTIC LEUKEMIA IN REMISSION (HCC): ICD-10-CM

## 2022-01-28 DIAGNOSIS — R71.8 MICROCYTOSIS: ICD-10-CM

## 2022-01-28 DIAGNOSIS — E83.52 HYPERCALCEMIA: Primary | ICD-10-CM

## 2022-01-28 DIAGNOSIS — D64.9 ANEMIA, UNSPECIFIED TYPE: ICD-10-CM

## 2022-01-28 LAB
ALBUMIN SERPL-MCNC: 4 G/DL (ref 3.4–5)
ALBUMIN/GLOB SERPL: 1.1 {RATIO} (ref 1–2)
ALP LIVER SERPL-CCNC: 65 U/L
ALT SERPL-CCNC: 36 U/L
ANION GAP SERPL CALC-SCNC: 9 MMOL/L (ref 0–18)
AST SERPL-CCNC: 22 U/L (ref 15–37)
BASOPHILS # BLD AUTO: 0.05 X10(3) UL (ref 0–0.2)
BASOPHILS NFR BLD AUTO: 0.5 %
BILIRUB SERPL-MCNC: 0.6 MG/DL (ref 0.1–2)
BUN BLD-MCNC: 15 MG/DL (ref 7–18)
CALCIUM BLD-MCNC: 10.2 MG/DL (ref 8.5–10.1)
CHLORIDE SERPL-SCNC: 102 MMOL/L (ref 98–112)
CO2 SERPL-SCNC: 28 MMOL/L (ref 21–32)
CREAT BLD-MCNC: 0.7 MG/DL
DEPRECATED HBV CORE AB SER IA-ACNC: 111.6 NG/ML
EOSINOPHIL # BLD AUTO: 0.13 X10(3) UL (ref 0–0.7)
EOSINOPHIL NFR BLD AUTO: 1.3 %
ERYTHROCYTE [DISTWIDTH] IN BLOOD BY AUTOMATED COUNT: 16.8 %
GLOBULIN PLAS-MCNC: 3.7 G/DL (ref 2.8–4.4)
GLUCOSE BLD-MCNC: 102 MG/DL (ref 70–99)
HCT VFR BLD AUTO: 38.8 %
HGB BLD-MCNC: 11.4 G/DL
IMM GRANULOCYTES # BLD AUTO: 0.03 X10(3) UL (ref 0–1)
IMM GRANULOCYTES NFR BLD: 0.3 %
IRON SATN MFR SERPL: 21 %
IRON SERPL-MCNC: 116 UG/DL
LYMPHOCYTES # BLD AUTO: 3.9 X10(3) UL (ref 1–4)
LYMPHOCYTES NFR BLD AUTO: 40.1 %
MCH RBC QN AUTO: 18.3 PG (ref 26–34)
MCHC RBC AUTO-ENTMCNC: 29.4 G/DL (ref 31–37)
MCV RBC AUTO: 62.4 FL
MONOCYTES # BLD AUTO: 0.5 X10(3) UL (ref 0.1–1)
MONOCYTES NFR BLD AUTO: 5.1 %
NEUTROPHILS # BLD AUTO: 5.11 X10 (3) UL (ref 1.5–7.7)
NEUTROPHILS # BLD AUTO: 5.11 X10(3) UL (ref 1.5–7.7)
NEUTROPHILS NFR BLD AUTO: 52.7 %
OSMOLALITY SERPL CALC.SUM OF ELEC: 289 MOSM/KG (ref 275–295)
PLATELET # BLD AUTO: 297 10(3)UL (ref 150–450)
POTASSIUM SERPL-SCNC: 3.9 MMOL/L (ref 3.5–5.1)
PROT SERPL-MCNC: 7.7 G/DL (ref 6.4–8.2)
RBC # BLD AUTO: 6.22 X10(6)UL
SODIUM SERPL-SCNC: 139 MMOL/L (ref 136–145)
TIBC SERPL-MCNC: 556 UG/DL (ref 240–450)
TRANSFERRIN SERPL-MCNC: 373 MG/DL (ref 200–360)
WBC # BLD AUTO: 9.7 X10(3) UL (ref 4–11)

## 2022-01-28 PROCEDURE — 99214 OFFICE O/P EST MOD 30 MIN: CPT | Performed by: INTERNAL MEDICINE

## 2022-01-28 RX ORDER — ASPIRIN 81 MG/1
81 TABLET ORAL DAILY
COMMUNITY

## 2022-01-28 RX ORDER — MELATONIN
325
COMMUNITY

## 2022-01-28 NOTE — PROGRESS NOTES
Education Record    Learner:  Patient    Disease / Diagnosis:hx ALL  anemia    Barriers / Limitations:  None   Comments:    Method:  Discussion   Comments:    General Topics:  Plan of care reviewed   Comments:    Outcome:  Shows understanding   Comments:

## 2022-01-28 NOTE — TELEPHONE ENCOUNTER
Niesha Parrish MD  [facility]  Pls call. Adolfo bit high. Should stop supplemental Ca and recheck labs in 2-3 weeks. Other labs ok, Fe ok, may continue iron and B 12     Reviewed the above with patient.    Verbalized understanding

## 2022-01-31 ENCOUNTER — OFFICE VISIT (OUTPATIENT)
Dept: FAMILY MEDICINE CLINIC | Facility: CLINIC | Age: 62
End: 2022-01-31
Payer: COMMERCIAL

## 2022-01-31 ENCOUNTER — TELEPHONE (OUTPATIENT)
Dept: HEMATOLOGY/ONCOLOGY | Facility: HOSPITAL | Age: 62
End: 2022-01-31

## 2022-01-31 VITALS
HEIGHT: 62 IN | SYSTOLIC BLOOD PRESSURE: 138 MMHG | RESPIRATION RATE: 18 BRPM | DIASTOLIC BLOOD PRESSURE: 80 MMHG | BODY MASS INDEX: 29.44 KG/M2 | WEIGHT: 160 LBS | HEART RATE: 76 BPM

## 2022-01-31 DIAGNOSIS — E11.9 DIET-CONTROLLED TYPE 2 DIABETES MELLITUS (HCC): ICD-10-CM

## 2022-01-31 DIAGNOSIS — E03.9 ACQUIRED HYPOTHYROIDISM: ICD-10-CM

## 2022-01-31 DIAGNOSIS — I10 ESSENTIAL HYPERTENSION: Primary | ICD-10-CM

## 2022-01-31 PROBLEM — H25.13 AGE-RELATED NUCLEAR CATARACT OF BOTH EYES: Status: ACTIVE | Noted: 2021-08-11

## 2022-01-31 PROCEDURE — 3079F DIAST BP 80-89 MM HG: CPT | Performed by: FAMILY MEDICINE

## 2022-01-31 PROCEDURE — 3008F BODY MASS INDEX DOCD: CPT | Performed by: FAMILY MEDICINE

## 2022-01-31 PROCEDURE — 99213 OFFICE O/P EST LOW 20 MIN: CPT | Performed by: FAMILY MEDICINE

## 2022-01-31 PROCEDURE — 3075F SYST BP GE 130 - 139MM HG: CPT | Performed by: FAMILY MEDICINE

## 2022-01-31 RX ORDER — HYDROCHLOROTHIAZIDE 12.5 MG/1
12.5 TABLET ORAL DAILY
Qty: 90 TABLET | Refills: 3 | Status: SHIPPED | OUTPATIENT
Start: 2022-01-31 | End: 2023-01-26

## 2022-01-31 NOTE — TELEPHONE ENCOUNTER
LVM given calcium level from 1/28/22. Reviewed normal range as well. Asked her to call back with any additional questions.

## 2022-01-31 NOTE — TELEPHONE ENCOUNTER
Patient is going to see another  today and she need to know her Calcium Level. She would like a call back to speak with the RN she have an appointment at 330 PM today.

## 2022-02-01 NOTE — PROGRESS NOTES
Farhad Knox is a 64year old female coming in for had concerns including Blood Pressure (follow up for hypertension, mentioned that her  is a doctor and he prescribed her hydrochlorothiazide).     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      Recommendations are: continue present meds, lose weight by increased

## 2022-02-01 NOTE — ASSESSMENT & PLAN NOTE
Stable, Continue present management.     Thyroid  (most recent labs)   Lab Results   Component Value Date/Time    TSH 3.030 04/14/2021 12:00 AM    T4F 1.2 05/09/2018 02:49 PM         Endocrine Medications          Levothyroxine Sodium 75 MCG Oral Tab

## 2022-02-01 NOTE — ASSESSMENT & PLAN NOTE
As for her Diabetes, it is well controlled, no significant medication side effects noted. Recommendations are: continue present meds, lose weight by increased dietary compliance and exercise and will check labs as ordered. labs soon.     Lab Results   Co

## 2022-05-03 ENCOUNTER — PATIENT OUTREACH (OUTPATIENT)
Dept: FAMILY MEDICINE CLINIC | Facility: CLINIC | Age: 62
End: 2022-05-03

## 2022-06-27 DIAGNOSIS — E11.9 DIET-CONTROLLED TYPE 2 DIABETES MELLITUS (HCC): Chronic | ICD-10-CM

## 2022-06-27 RX ORDER — RAMIPRIL 5 MG/1
CAPSULE ORAL
Qty: 90 CAPSULE | Refills: 3 | Status: SHIPPED | OUTPATIENT
Start: 2022-06-27

## 2022-09-02 ENCOUNTER — TELEPHONE (OUTPATIENT)
Dept: FAMILY MEDICINE CLINIC | Facility: CLINIC | Age: 62
End: 2022-09-02

## 2022-09-02 DIAGNOSIS — Z12.31 VISIT FOR SCREENING MAMMOGRAM: ICD-10-CM

## 2022-09-02 DIAGNOSIS — Z11.59 ENCOUNTER FOR HEPATITIS C SCREENING TEST FOR LOW RISK PATIENT: ICD-10-CM

## 2022-09-02 DIAGNOSIS — E78.2 MIXED HYPERLIPIDEMIA: ICD-10-CM

## 2022-09-02 DIAGNOSIS — R73.9 HYPERGLYCEMIA: ICD-10-CM

## 2022-09-02 DIAGNOSIS — E03.9 ACQUIRED HYPOTHYROIDISM: ICD-10-CM

## 2022-09-02 DIAGNOSIS — E11.9 DIET-CONTROLLED TYPE 2 DIABETES MELLITUS (HCC): Chronic | ICD-10-CM

## 2022-09-02 DIAGNOSIS — E11.9 DIET-CONTROLLED TYPE 2 DIABETES MELLITUS (HCC): Primary | ICD-10-CM

## 2022-09-02 DIAGNOSIS — Z00.00 LABORATORY EXAMINATION ORDERED AS PART OF A ROUTINE GENERAL MEDICAL EXAMINATION: ICD-10-CM

## 2022-09-02 RX ORDER — FENOFIBRATE 134 MG/1
1 CAPSULE ORAL DAILY
COMMUNITY
Start: 2022-06-29

## 2022-09-02 RX ORDER — LEVOTHYROXINE SODIUM 0.07 MG/1
75 TABLET ORAL
Qty: 90 TABLET | Refills: 0 | Status: SHIPPED | OUTPATIENT
Start: 2022-09-02

## 2022-09-02 RX ORDER — SIMVASTATIN 40 MG
40 TABLET ORAL NIGHTLY
Qty: 90 TABLET | Refills: 0 | Status: SHIPPED | OUTPATIENT
Start: 2022-09-02

## 2022-09-02 RX ORDER — ALPRAZOLAM 0.5 MG/1
TABLET ORAL
Qty: 30 TABLET | Refills: 1 | Status: SHIPPED | OUTPATIENT
Start: 2022-09-02

## 2022-09-02 RX ORDER — METFORMIN HYDROCHLORIDE 500 MG/1
500 TABLET, EXTENDED RELEASE ORAL 2 TIMES DAILY
COMMUNITY
Start: 2022-07-26 | End: 2022-09-02

## 2022-09-02 NOTE — TELEPHONE ENCOUNTER
Pt calling to request refills on:    Ramipril  Metformin  Alprazolam   Hydrochlorothiazide    Levothyroxine    States the pharmacy requested them days ago but we have not received anything. Pt is scheduled for Px on 10/3/22 with Dr. Adela Dexter.

## 2022-09-02 NOTE — TELEPHONE ENCOUNTER
1. Diet-controlled type 2 diabetes mellitus ([facility] [address] (Primary)  Overview:  Dx 1/2015 with A1c 6.6% after several years of prediabetes, diet controlled  Orders:  -     Comp Metabolic Panel (14); Future; Expected date: 09/02/2022  -     Lipid Panel; Future; Expected date: 09/02/2022  -     Hemoglobin A1C; Future; Expected date: 09/02/2022  -     Microalb/Creat Ratio, Random Urine; Future; Expected date: 09/02/2022  2. Acquired hypothyroidism  Overview:  Levothyroxine 75 since 11/17  Orders:  -     TSH W Reflex To Free T4; Future; Expected date: 09/02/2022  3. Visit for screening mammogram  -     [facility] SCREENING BILAT (CPT=77067); Future; Expected date: 09/02/2022  4. Laboratory examination ordered as part of a routine general medical examination  -     Comp Metabolic Panel (14); Future; Expected date: 09/02/2022  -     Lipid Panel; Future; Expected date: 09/02/2022  -     TSH W Reflex To Free T4; Future; Expected date: 09/02/2022  -     Hemoglobin A1C; Future; Expected date: 09/02/2022  -     HCV Antibody; Future; Expected date: 09/02/2022  -     CBC, Platelet; No Differential; Future; Expected date: 09/02/2022  -     Microalb/Creat Ratio, Random Urine; Future; Expected date: 09/02/2022  5. Encounter for hepatitis C screening test for low risk patient  -     HCV Antibody; Future; Expected date: 09/02/2022  6. Hyperglycemia  -     Hemoglobin A1C; Future; Expected date: 09/02/2022       OK to notify.  Thanks, Juan David Alves MD

## 2022-09-02 NOTE — TELEPHONE ENCOUNTER
Please enter lab orders for the patient's upcoming physical appointment. Physical scheduled: Your appointments     Date & Time Appointment Department [facility])    Oct 03, 2022  2:30 PM CDT Physical - Established with Arlene Waldron MD [facility], [address], [place]  ([facility])            [id number]         Preferred lab: [facility])     The patient has been notified to complete fasting labs prior to their physical appointment.

## 2022-10-02 NOTE — ASSESSMENT & PLAN NOTE
As for her Diabetes, it is well controlled, no significant medication side effects noted. Recommendations are: continue present meds, lose weight by increased dietary compliance and exercise and will check labs as ordered.     Lab Results   Component Value Date    A1C 6.1 04/14/2021    A1C 6.3 (A) 11/28/2018      Blood Sugar Medications          metFORMIN 500 MG Oral Tab

## 2022-10-02 NOTE — ASSESSMENT & PLAN NOTE
Stable, Continue present management.     Thyroid  (most recent labs)   Lab Results   Component Value Date/Time    TSH 3.030 04/14/2021 12:00 AM    T4F 1.2 05/09/2018 02:49 PM         Endocrine Medications          levothyroxine 75 MCG Oral Tab

## 2022-10-02 NOTE — ASSESSMENT & PLAN NOTE
Stable, Continue present management.     Cholesterol Lowering Medications          Fenofibrate 134 MG Oral Cap    simvastatin 40 MG Oral Tab    Choline Fenofibrate (FENOFIBRIC ACID) 135 MG Oral Capsule Delayed Release    Omega-3-acid Ethyl Esters 1 g Oral Cap

## 2022-10-03 ENCOUNTER — TELEPHONE (OUTPATIENT)
Dept: FAMILY MEDICINE CLINIC | Facility: CLINIC | Age: 62
End: 2022-10-03

## 2022-10-03 ENCOUNTER — OFFICE VISIT (OUTPATIENT)
Dept: FAMILY MEDICINE CLINIC | Facility: CLINIC | Age: 62
End: 2022-10-03
Payer: COMMERCIAL

## 2022-10-03 VITALS
BODY MASS INDEX: 29.78 KG/M2 | DIASTOLIC BLOOD PRESSURE: 70 MMHG | WEIGHT: 161.81 LBS | HEART RATE: 72 BPM | SYSTOLIC BLOOD PRESSURE: 128 MMHG | RESPIRATION RATE: 18 BRPM | HEIGHT: 62 IN

## 2022-10-03 DIAGNOSIS — I10 ESSENTIAL HYPERTENSION: ICD-10-CM

## 2022-10-03 DIAGNOSIS — E03.9 ACQUIRED HYPOTHYROIDISM: ICD-10-CM

## 2022-10-03 DIAGNOSIS — Z00.00 ANNUAL PHYSICAL EXAM: Primary | ICD-10-CM

## 2022-10-03 DIAGNOSIS — M25.541 ARTHRALGIA OF BOTH HANDS: ICD-10-CM

## 2022-10-03 DIAGNOSIS — F32.1 CURRENT MODERATE EPISODE OF MAJOR DEPRESSIVE DISORDER WITHOUT PRIOR EPISODE (HCC): ICD-10-CM

## 2022-10-03 DIAGNOSIS — Z86.010 HISTORY OF COLON POLYPS: ICD-10-CM

## 2022-10-03 DIAGNOSIS — Z78.0 ASYMPTOMATIC MENOPAUSE: ICD-10-CM

## 2022-10-03 DIAGNOSIS — M25.542 ARTHRALGIA OF BOTH HANDS: ICD-10-CM

## 2022-10-03 DIAGNOSIS — M81.0 AGE-RELATED OSTEOPOROSIS WITHOUT CURRENT PATHOLOGICAL FRACTURE: ICD-10-CM

## 2022-10-03 DIAGNOSIS — E11.9 DIET-CONTROLLED TYPE 2 DIABETES MELLITUS (HCC): Chronic | ICD-10-CM

## 2022-10-03 DIAGNOSIS — Z12.11 SCREEN FOR COLON CANCER: ICD-10-CM

## 2022-10-03 DIAGNOSIS — E78.2 MIXED HYPERLIPIDEMIA: ICD-10-CM

## 2022-10-03 DIAGNOSIS — D56.1 BETA-THALASSEMIA (HCC): ICD-10-CM

## 2022-10-03 PROCEDURE — 3078F DIAST BP <80 MM HG: CPT | Performed by: FAMILY MEDICINE

## 2022-10-03 PROCEDURE — 3074F SYST BP LT 130 MM HG: CPT | Performed by: FAMILY MEDICINE

## 2022-10-03 PROCEDURE — 3008F BODY MASS INDEX DOCD: CPT | Performed by: FAMILY MEDICINE

## 2022-10-03 PROCEDURE — 99396 PREV VISIT EST AGE 40-64: CPT | Performed by: FAMILY MEDICINE

## 2022-10-03 RX ORDER — LANCETS
1 EACH MISCELLANEOUS DAILY
Qty: 100 EACH | Refills: 3 | Status: SHIPPED | OUTPATIENT
Start: 2022-10-03 | End: 2023-10-03

## 2022-10-03 RX ORDER — METFORMIN HYDROCHLORIDE 500 MG/1
500 TABLET, EXTENDED RELEASE ORAL
Qty: 90 TABLET | Refills: 3 | Status: SHIPPED | OUTPATIENT
Start: 2022-10-03

## 2022-10-03 RX ORDER — HYDROCHLOROTHIAZIDE 12.5 MG/1
12.5 TABLET ORAL DAILY
Qty: 90 TABLET | Refills: 3 | Status: SHIPPED | OUTPATIENT
Start: 2022-10-03 | End: 2023-09-28

## 2022-10-03 RX ORDER — BLOOD SUGAR DIAGNOSTIC
STRIP MISCELLANEOUS
Qty: 100 STRIP | Refills: 3 | Status: SHIPPED | OUTPATIENT
Start: 2022-10-03 | End: 2023-10-03

## 2022-10-03 RX ORDER — BLOOD-GLUCOSE METER
EACH MISCELLANEOUS
Qty: 1 KIT | Refills: 0 | Status: SHIPPED | OUTPATIENT
Start: 2022-10-03

## 2022-10-03 NOTE — TELEPHONE ENCOUNTER
Ayaan from [facility] requesting frequency on pt's medication Glucose Blood (ACCU-CHEK NASIR PLUS) In Vitro Strip.

## 2022-10-04 RX ORDER — BLOOD SUGAR DIAGNOSTIC
STRIP MISCELLANEOUS
Qty: 100 STRIP | Refills: 3 | Status: SHIPPED | OUTPATIENT
Start: 2022-10-04 | End: 2022-10-06

## 2022-10-04 NOTE — ASSESSMENT & PLAN NOTE
Stable, Continue present management.     Blood Pressure and Cardiac Medications          RAMIPRIL 5 MG Oral Cap

## 2022-10-06 ENCOUNTER — TELEPHONE (OUTPATIENT)
Dept: FAMILY MEDICINE CLINIC | Facility: CLINIC | Age: 62
End: 2022-10-06

## 2022-10-06 DIAGNOSIS — E11.9 DIET-CONTROLLED TYPE 2 DIABETES MELLITUS (HCC): Chronic | ICD-10-CM

## 2022-10-06 RX ORDER — BLOOD SUGAR DIAGNOSTIC
STRIP MISCELLANEOUS
Qty: 100 STRIP | Refills: 3 | Status: SHIPPED | OUTPATIENT
Start: 2022-10-06 | End: 2023-10-06

## 2022-10-06 NOTE — TELEPHONE ENCOUNTER
Called pharmacy and they said they needed a SIG with the number of times a day she is testing So I reworte it as once daily

## 2022-10-06 NOTE — TELEPHONE ENCOUNTER
Pt states she is unable to  Glucose Blood (ACCU-CHEK NASIR PLUS) In Vitro Strip from [employer]. She was told it needs to be approved by insurance first. Please assit Pt.

## 2022-10-17 ENCOUNTER — HOSPITAL ENCOUNTER (OUTPATIENT)
Dept: BONE DENSITY | Age: 62
Discharge: HOME | End: 2022-10-17
Attending: FAMILY MEDICINE
Payer: COMMERCIAL

## 2022-10-17 DIAGNOSIS — M81.0 AGE-RELATED OSTEOPOROSIS WITHOUT CURRENT PATHOLOGICAL FRACTURE: ICD-10-CM

## 2022-10-17 DIAGNOSIS — Z78.0 ASYMPTOMATIC MENOPAUSE: ICD-10-CM

## 2022-10-17 PROCEDURE — 77080 DXA BONE DENSITY AXIAL: CPT | Performed by: FAMILY MEDICINE

## 2022-10-19 ENCOUNTER — LAB ENCOUNTER (OUTPATIENT)
Dept: LAB | Age: 62
End: 2022-10-19
Attending: FAMILY MEDICINE
Payer: COMMERCIAL

## 2022-10-19 DIAGNOSIS — E11.9 DIET-CONTROLLED TYPE 2 DIABETES MELLITUS (HCC): ICD-10-CM

## 2022-10-19 DIAGNOSIS — E03.9 ACQUIRED HYPOTHYROIDISM: ICD-10-CM

## 2022-10-19 DIAGNOSIS — Z11.59 ENCOUNTER FOR HEPATITIS C SCREENING TEST FOR LOW RISK PATIENT: ICD-10-CM

## 2022-10-19 DIAGNOSIS — Z00.00 LABORATORY EXAMINATION ORDERED AS PART OF A ROUTINE GENERAL MEDICAL EXAMINATION: ICD-10-CM

## 2022-10-19 DIAGNOSIS — E11.9 DIABETES MELLITUS (HCC): Primary | ICD-10-CM

## 2022-10-19 DIAGNOSIS — R73.9 HYPERGLYCEMIA: ICD-10-CM

## 2022-10-19 LAB
ALBUMIN SERPL-MCNC: 4.1 G/DL (ref 3.4–5)
ALBUMIN/GLOB SERPL: 1.1 {RATIO} (ref 1–2)
ALP LIVER SERPL-CCNC: 67 U/L
ALT SERPL-CCNC: 32 U/L
ANION GAP SERPL CALC-SCNC: 6 MMOL/L (ref 0–18)
AST SERPL-CCNC: 17 U/L (ref 15–37)
BILIRUB SERPL-MCNC: 0.5 MG/DL (ref 0.1–2)
BUN BLD-MCNC: 16 MG/DL (ref 7–18)
BUN/CREAT SERPL: 23.9 (ref 10–20)
CALCIUM BLD-MCNC: 10 MG/DL (ref 8.5–10.1)
CHLORIDE SERPL-SCNC: 102 MMOL/L (ref 98–112)
CHOLEST SERPL-MCNC: 158 MG/DL (ref ?–200)
CO2 SERPL-SCNC: 28 MMOL/L (ref 21–32)
CREAT BLD-MCNC: 0.67 MG/DL
CREAT UR-SCNC: 93.4 MG/DL
CRP SERPL-MCNC: <0.29 MG/DL (ref ?–0.3)
DEPRECATED RDW RBC AUTO: 33.3 FL (ref 35.1–46.3)
ERYTHROCYTE [DISTWIDTH] IN BLOOD BY AUTOMATED COUNT: 15.3 % (ref 11–15)
ERYTHROCYTE [SEDIMENTATION RATE] IN BLOOD: 51 MM/HR
EST. AVERAGE GLUCOSE BLD GHB EST-MCNC: 154 MG/DL (ref 68–126)
FASTING PATIENT LIPID ANSWER: YES
FASTING STATUS PATIENT QL REPORTED: YES
GFR SERPLBLD BASED ON 1.73 SQ M-ARVRAT: 99 ML/MIN/1.73M2 (ref 60–?)
GLOBULIN PLAS-MCNC: 3.6 G/DL (ref 2.8–4.4)
GLUCOSE BLD-MCNC: 109 MG/DL (ref 70–99)
HBA1C MFR BLD: 7 % (ref ?–5.7)
HCT VFR BLD AUTO: 38.7 %
HCV AB SERPL QL IA: NONREACTIVE
HDLC SERPL-MCNC: 53 MG/DL (ref 40–59)
HGB BLD-MCNC: 11.6 G/DL
LDLC SERPL CALC-MCNC: 86 MG/DL (ref ?–100)
MCH RBC QN AUTO: 19.1 PG (ref 26–34)
MCHC RBC AUTO-ENTMCNC: 30 G/DL (ref 31–37)
MCV RBC AUTO: 63.7 FL
MICROALBUMIN UR-MCNC: 3.34 MG/DL
MICROALBUMIN/CREAT 24H UR-RTO: 35.8 UG/MG (ref ?–30)
NONHDLC SERPL-MCNC: 105 MG/DL (ref ?–130)
OSMOLALITY SERPL CALC.SUM OF ELEC: 284 MOSM/KG (ref 275–295)
PLATELET # BLD AUTO: 308 10(3)UL (ref 150–450)
POTASSIUM SERPL-SCNC: 4.2 MMOL/L (ref 3.5–5.1)
PROT SERPL-MCNC: 7.7 G/DL (ref 6.4–8.2)
RBC # BLD AUTO: 6.08 X10(6)UL
SODIUM SERPL-SCNC: 136 MMOL/L (ref 136–145)
TRIGL SERPL-MCNC: 107 MG/DL (ref 30–149)
TSI SER-ACNC: 3.28 MIU/ML (ref 0.36–3.74)
VLDLC SERPL CALC-MCNC: 17 MG/DL (ref 0–30)
WBC # BLD AUTO: 10 X10(3) UL (ref 4–11)

## 2022-10-19 PROCEDURE — 83036 HEMOGLOBIN GLYCOSYLATED A1C: CPT | Performed by: FAMILY MEDICINE

## 2022-10-19 PROCEDURE — 85060 BLOOD SMEAR INTERPRETATION: CPT | Performed by: FAMILY MEDICINE

## 2022-10-19 PROCEDURE — 86803 HEPATITIS C AB TEST: CPT | Performed by: FAMILY MEDICINE

## 2022-10-19 PROCEDURE — 82570 ASSAY OF URINE CREATININE: CPT | Performed by: FAMILY MEDICINE

## 2022-10-19 PROCEDURE — 86038 ANTINUCLEAR ANTIBODIES: CPT | Performed by: FAMILY MEDICINE

## 2022-10-19 PROCEDURE — 86140 C-REACTIVE PROTEIN: CPT | Performed by: FAMILY MEDICINE

## 2022-10-19 PROCEDURE — 80061 LIPID PANEL: CPT | Performed by: FAMILY MEDICINE

## 2022-10-19 PROCEDURE — 86225 DNA ANTIBODY NATIVE: CPT | Performed by: FAMILY MEDICINE

## 2022-10-19 PROCEDURE — 84443 ASSAY THYROID STIM HORMONE: CPT | Performed by: FAMILY MEDICINE

## 2022-10-19 PROCEDURE — 85652 RBC SED RATE AUTOMATED: CPT | Performed by: FAMILY MEDICINE

## 2022-10-19 PROCEDURE — 85027 COMPLETE CBC AUTOMATED: CPT | Performed by: FAMILY MEDICINE

## 2022-10-19 PROCEDURE — 80053 COMPREHEN METABOLIC PANEL: CPT | Performed by: FAMILY MEDICINE

## 2022-10-19 PROCEDURE — 82043 UR ALBUMIN QUANTITATIVE: CPT | Performed by: FAMILY MEDICINE

## 2022-10-21 LAB
DSDNA IGG SERPL IA-ACNC: <0.6 IU/ML
ENA AB SER QL IA: 0.1 UG/L
ENA AB SER QL IA: NEGATIVE

## 2022-11-01 ENCOUNTER — TELEPHONE (OUTPATIENT)
Dept: FAMILY MEDICINE CLINIC | Facility: CLINIC | Age: 62
End: 2022-11-01

## 2022-11-01 NOTE — TELEPHONE ENCOUNTER
It looks like BECCA was ordered, but not completed.     Routed to Dr. Olesya Craft - do you want patient to return to the lab for this test?

## 2022-11-01 NOTE — TELEPHONE ENCOUNTER
Connective tissue panel is the new BECCA, it was negative, no need to do OLD BECCA as new panel is better.  See lab message from early septmeber

## 2022-11-04 ENCOUNTER — TELEPHONE (OUTPATIENT)
Dept: FAMILY MEDICINE CLINIC | Facility: CLINIC | Age: 62
End: 2022-11-04

## 2022-11-04 RX ORDER — BLOOD-GLUCOSE METER
1 EACH MISCELLANEOUS DAILY
Qty: 1 KIT | Refills: 0 | Status: SHIPPED | OUTPATIENT
Start: 2022-11-04

## 2022-11-04 RX ORDER — BLOOD SUGAR DIAGNOSTIC
STRIP MISCELLANEOUS
Qty: 100 STRIP | Refills: 3 | Status: SHIPPED | OUTPATIENT
Start: 2022-11-04 | End: 2023-11-04

## 2022-11-04 NOTE — TELEPHONE ENCOUNTER
Accu-chek avivia being phased out sent new accu-chek Guide me device and strips notified pharmacy of this

## 2022-11-04 NOTE — TELEPHONE ENCOUNTER
[address] called regarding rx for Ethyl Budds. Pharmacy stated this manufacture is on back order and pharmacy needs a new script for something else along with lancets and test strips.

## 2023-01-05 NOTE — TELEPHONE ENCOUNTER
Patient see's something in her nose and her neighbor, who is a doctor,  told her to see an ENT, she would like a recommendation. Please call. Hydroxychloroquine Pregnancy And Lactation Text: This medication has been shown to cause fetal harm but it isn't assigned a Pregnancy Risk Category. There are small amounts excreted in breast milk.

## 2023-01-23 DIAGNOSIS — E03.9 ACQUIRED HYPOTHYROIDISM: ICD-10-CM

## 2023-01-24 RX ORDER — LEVOTHYROXINE SODIUM 0.07 MG/1
TABLET ORAL
Qty: 90 TABLET | Refills: 0 | Status: SHIPPED | OUTPATIENT
Start: 2023-01-24

## 2023-01-25 ENCOUNTER — TELEPHONE (OUTPATIENT)
Dept: FAMILY MEDICINE CLINIC | Facility: CLINIC | Age: 63
End: 2023-01-25

## 2023-01-25 NOTE — TELEPHONE ENCOUNTER
Patient called referral line. She is asking for her Levothyroxine as she is leaving to travel tomorrow. She states she will come in when she gets back. Patient was to return in three months. Patient can be reached at [phone].

## 2023-02-15 ENCOUNTER — OFFICE VISIT (OUTPATIENT)
Dept: HEMATOLOGY/ONCOLOGY | Facility: HOSPITAL | Age: 63
End: 2023-02-15
Attending: INTERNAL MEDICINE
Payer: COMMERCIAL

## 2023-02-15 VITALS
DIASTOLIC BLOOD PRESSURE: 84 MMHG | TEMPERATURE: 97 F | RESPIRATION RATE: 16 BRPM | BODY MASS INDEX: 29 KG/M2 | OXYGEN SATURATION: 100 % | HEART RATE: 82 BPM | WEIGHT: 159.38 LBS | SYSTOLIC BLOOD PRESSURE: 172 MMHG

## 2023-02-15 DIAGNOSIS — D64.9 ANEMIA, UNSPECIFIED TYPE: Primary | ICD-10-CM

## 2023-02-15 DIAGNOSIS — C91.01 ACUTE LYMPHOCYTIC LEUKEMIA IN REMISSION (HCC): ICD-10-CM

## 2023-02-15 LAB
ALBUMIN SERPL-MCNC: 3.9 G/DL (ref 3.4–5)
ALBUMIN/GLOB SERPL: 1.1 {RATIO} (ref 1–2)
ALP LIVER SERPL-CCNC: 85 U/L
ALT SERPL-CCNC: 34 U/L
ANION GAP SERPL CALC-SCNC: 1 MMOL/L (ref 0–18)
AST SERPL-CCNC: 17 U/L (ref 15–37)
BASOPHILS # BLD AUTO: 0.04 X10(3) UL (ref 0–0.2)
BASOPHILS NFR BLD AUTO: 0.4 %
BILIRUB SERPL-MCNC: 0.4 MG/DL (ref 0.1–2)
BUN BLD-MCNC: 12 MG/DL (ref 7–18)
CALCIUM BLD-MCNC: 9.9 MG/DL (ref 8.5–10.1)
CHLORIDE SERPL-SCNC: 104 MMOL/L (ref 98–112)
CO2 SERPL-SCNC: 32 MMOL/L (ref 21–32)
CREAT BLD-MCNC: 0.65 MG/DL
EOSINOPHIL # BLD AUTO: 0.15 X10(3) UL (ref 0–0.7)
EOSINOPHIL NFR BLD AUTO: 1.5 %
ERYTHROCYTE [DISTWIDTH] IN BLOOD BY AUTOMATED COUNT: 17.4 %
GFR SERPLBLD BASED ON 1.73 SQ M-ARVRAT: 99 ML/MIN/1.73M2 (ref 60–?)
GLOBULIN PLAS-MCNC: 3.5 G/DL (ref 2.8–4.4)
GLUCOSE BLD-MCNC: 114 MG/DL (ref 70–99)
HCT VFR BLD AUTO: 37.3 %
HGB BLD-MCNC: 11.3 G/DL
IMM GRANULOCYTES # BLD AUTO: 0.04 X10(3) UL (ref 0–1)
IMM GRANULOCYTES NFR BLD: 0.4 %
LDH SERPL L TO P-CCNC: 120 U/L
LYMPHOCYTES # BLD AUTO: 3.78 X10(3) UL (ref 1–4)
LYMPHOCYTES NFR BLD AUTO: 38.2 %
MCH RBC QN AUTO: 19 PG (ref 26–34)
MCHC RBC AUTO-ENTMCNC: 30.3 G/DL (ref 31–37)
MCV RBC AUTO: 62.7 FL
MONOCYTES # BLD AUTO: 0.64 X10(3) UL (ref 0.1–1)
MONOCYTES NFR BLD AUTO: 6.5 %
NEUTROPHILS # BLD AUTO: 5.25 X10 (3) UL (ref 1.5–7.7)
NEUTROPHILS # BLD AUTO: 5.25 X10(3) UL (ref 1.5–7.7)
NEUTROPHILS NFR BLD AUTO: 53 %
OSMOLALITY SERPL CALC.SUM OF ELEC: 285 MOSM/KG (ref 275–295)
PLATELET # BLD AUTO: 283 10(3)UL (ref 150–450)
POTASSIUM SERPL-SCNC: 3.8 MMOL/L (ref 3.5–5.1)
PROT SERPL-MCNC: 7.4 G/DL (ref 6.4–8.2)
RBC # BLD AUTO: 5.95 X10(6)UL
SODIUM SERPL-SCNC: 137 MMOL/L (ref 136–145)
URATE SERPL-MCNC: 3.6 MG/DL
WBC # BLD AUTO: 9.9 X10(3) UL (ref 4–11)

## 2023-02-15 PROCEDURE — 99213 OFFICE O/P EST LOW 20 MIN: CPT | Performed by: INTERNAL MEDICINE

## 2023-02-15 NOTE — PROGRESS NOTES
Education Record    Learner:  Patient    Disease / Diagnosis:hx ALL    Barriers / Limitations:  None   Comments:    Method:  Discussion   Comments:    General Topics:  Plan of care reviewed   Comments:    Outcome:  Shows understanding   Comments:    Pt feeling well. Pt having some anxiety, trip to [place] soon for about 2 months.

## 2023-02-16 ENCOUNTER — TELEPHONE (OUTPATIENT)
Dept: HEMATOLOGY/ONCOLOGY | Facility: HOSPITAL | Age: 63
End: 2023-02-16

## 2023-02-16 DIAGNOSIS — E78.2 MIXED HYPERLIPIDEMIA: ICD-10-CM

## 2023-02-17 NOTE — TELEPHONE ENCOUNTER
Zheng Erickson MD  [facility]  Please call, labs ok, nothing concerning for leukemia     Reviewed the above, verbalized understanding.

## 2023-02-18 RX ORDER — SIMVASTATIN 40 MG
TABLET ORAL
Qty: 90 TABLET | Refills: 0 | Status: SHIPPED | OUTPATIENT
Start: 2023-02-18

## 2023-02-22 ENCOUNTER — TELEPHONE (OUTPATIENT)
Dept: FAMILY MEDICINE CLINIC | Facility: CLINIC | Age: 63
End: 2023-02-22

## 2023-02-22 RX ORDER — FLUVOXAMINE MALEATE 50 MG/1
50 TABLET, COATED ORAL EVERY 24 HOURS
COMMUNITY
Start: 2023-02-17

## 2023-02-22 NOTE — TELEPHONE ENCOUNTER
Pt is calling [facility] is saying they don't have the prescription for her Levothyroxine please call [facility] because she is leaving tomorrow.

## 2023-02-22 NOTE — TELEPHONE ENCOUNTER
Patient traveling to [facility] and needs 6 months of medication. I told her she will need to go into the pharmacy and explain this to them and pay casg for the next 6 months.

## 2023-06-16 DIAGNOSIS — E78.2 MIXED HYPERLIPIDEMIA: ICD-10-CM

## 2023-06-17 RX ORDER — SIMVASTATIN 40 MG
TABLET ORAL
Qty: 90 TABLET | Refills: 1 | Status: SHIPPED | OUTPATIENT
Start: 2023-06-17

## 2023-09-01 DIAGNOSIS — E11.9 DIET-CONTROLLED TYPE 2 DIABETES MELLITUS (HCC): Chronic | ICD-10-CM

## 2023-09-07 PROCEDURE — 3051F HG A1C>EQUAL 7.0%<8.0%: CPT | Performed by: FAMILY MEDICINE

## 2023-09-14 ENCOUNTER — OFFICE VISIT (OUTPATIENT)
Dept: FAMILY MEDICINE CLINIC | Facility: CLINIC | Age: 63
End: 2023-09-14
Payer: COMMERCIAL

## 2023-09-14 VITALS
HEART RATE: 78 BPM | DIASTOLIC BLOOD PRESSURE: 84 MMHG | HEIGHT: 62 IN | RESPIRATION RATE: 16 BRPM | WEIGHT: 157.19 LBS | BODY MASS INDEX: 28.93 KG/M2 | SYSTOLIC BLOOD PRESSURE: 132 MMHG

## 2023-09-14 DIAGNOSIS — I10 ESSENTIAL HYPERTENSION: ICD-10-CM

## 2023-09-14 DIAGNOSIS — Z12.31 VISIT FOR SCREENING MAMMOGRAM: ICD-10-CM

## 2023-09-14 DIAGNOSIS — E03.9 ACQUIRED HYPOTHYROIDISM: ICD-10-CM

## 2023-09-14 DIAGNOSIS — E78.2 MIXED HYPERLIPIDEMIA: ICD-10-CM

## 2023-09-14 DIAGNOSIS — F32.1 CURRENT MODERATE EPISODE OF MAJOR DEPRESSIVE DISORDER WITHOUT PRIOR EPISODE (HCC): ICD-10-CM

## 2023-09-14 DIAGNOSIS — E11.9 DIET-CONTROLLED TYPE 2 DIABETES MELLITUS (HCC): Primary | Chronic | ICD-10-CM

## 2023-09-14 DIAGNOSIS — Z12.11 SCREEN FOR COLON CANCER: ICD-10-CM

## 2023-09-14 PROCEDURE — 3075F SYST BP GE 130 - 139MM HG: CPT | Performed by: FAMILY MEDICINE

## 2023-09-14 PROCEDURE — 99214 OFFICE O/P EST MOD 30 MIN: CPT | Performed by: FAMILY MEDICINE

## 2023-09-14 PROCEDURE — 3008F BODY MASS INDEX DOCD: CPT | Performed by: FAMILY MEDICINE

## 2023-09-14 PROCEDURE — 3079F DIAST BP 80-89 MM HG: CPT | Performed by: FAMILY MEDICINE

## 2023-09-14 RX ORDER — RAMIPRIL 5 MG/1
5 CAPSULE ORAL DAILY
Qty: 90 CAPSULE | Refills: 3 | Status: SHIPPED | OUTPATIENT
Start: 2023-09-14

## 2023-09-14 RX ORDER — FENOFIBRATE 134 MG/1
1 CAPSULE ORAL DAILY
Qty: 90 CAPSULE | Refills: 3 | Status: SHIPPED | OUTPATIENT
Start: 2023-09-14

## 2023-09-14 RX ORDER — ALPRAZOLAM 0.5 MG/1
TABLET ORAL
Qty: 30 TABLET | Refills: 3 | Status: SHIPPED | OUTPATIENT
Start: 2023-09-14

## 2023-09-14 RX ORDER — HYDROCHLOROTHIAZIDE 12.5 MG/1
12.5 TABLET ORAL DAILY
Qty: 90 TABLET | Refills: 3 | Status: SHIPPED | OUTPATIENT
Start: 2023-09-14 | End: 2024-09-08

## 2023-09-14 RX ORDER — FLUTICASONE PROPIONATE 50 MCG
2 SPRAY, SUSPENSION (ML) NASAL DAILY
Qty: 3 EACH | Refills: 1 | Status: SHIPPED | OUTPATIENT
Start: 2023-09-14

## 2023-09-14 RX ORDER — LEVOTHYROXINE SODIUM 0.07 MG/1
75 TABLET ORAL
Qty: 90 TABLET | Refills: 3 | Status: SHIPPED | OUTPATIENT
Start: 2023-09-14

## 2023-09-14 RX ORDER — RAMIPRIL 5 MG/1
CAPSULE ORAL
Qty: 90 CAPSULE | Refills: 3 | OUTPATIENT
Start: 2023-09-14

## 2023-09-14 RX ORDER — ALPRAZOLAM 0.5 MG/1
TABLET ORAL
Qty: 30 TABLET | Refills: 0 | OUTPATIENT
Start: 2023-09-14

## 2023-09-19 ENCOUNTER — HOSPITAL ENCOUNTER (OUTPATIENT)
Dept: MAMMOGRAPHY | Age: 63
Discharge: HOME | End: 2023-09-19
Attending: FAMILY MEDICINE
Payer: COMMERCIAL

## 2023-09-19 DIAGNOSIS — Z12.31 VISIT FOR SCREENING MAMMOGRAM: ICD-10-CM

## 2023-09-19 PROCEDURE — 77063 BREAST TOMOSYNTHESIS BI: CPT | Performed by: FAMILY MEDICINE

## 2023-09-19 PROCEDURE — 77067 SCR MAMMO BI INCL CAD: CPT | Performed by: FAMILY MEDICINE

## 2023-10-03 ENCOUNTER — TELEPHONE (OUTPATIENT)
Dept: PREADMISSION TESTING | Age: 63
End: 2023-10-03

## 2023-10-03 RX ORDER — LEVOTHYROXINE SODIUM 0.07 MG/1
75 TABLET ORAL DAILY
COMMUNITY

## 2023-10-03 RX ORDER — ASPIRIN 81 MG/1
81 TABLET, CHEWABLE ORAL DAILY
COMMUNITY

## 2023-10-03 RX ORDER — HYDROCHLOROTHIAZIDE 12.5 MG/1
12.5 TABLET ORAL DAILY
COMMUNITY

## 2023-10-03 RX ORDER — SIMVASTATIN 40 MG
40 TABLET ORAL NIGHTLY
COMMUNITY

## 2023-11-06 ENCOUNTER — APPOINTMENT (OUTPATIENT)
Dept: GASTROENTEROLOGY | Age: 63
End: 2023-11-06
Attending: INTERNAL MEDICINE

## 2023-12-15 DIAGNOSIS — E11.9 DIET-CONTROLLED TYPE 2 DIABETES MELLITUS (HCC): Chronic | ICD-10-CM

## 2023-12-20 RX ORDER — METFORMIN HYDROCHLORIDE 500 MG/1
500 TABLET, EXTENDED RELEASE ORAL
Qty: 90 TABLET | Refills: 3 | Status: SHIPPED | OUTPATIENT
Start: 2023-12-20

## 2023-12-20 NOTE — TELEPHONE ENCOUNTER
Requested Prescriptions     Pending Prescriptions Disp Refills    METFORMIN  MG Oral Tablet 24 Hr [Pharmacy Med Name: METFORMIN ER 500MG 24HR TABS] 90 tablet 3     Sig: TAKE 1 TABLET(500 MG) BY MOUTH DAILY WITH BREAKFAST     LOV 9/14/2023     Patient was asked to follow-up in: 6 months    Appointment scheduled: 1/10/2024 Eirc Jones MD     Medication failed protocol.    Routed to front staff     Patient is due for their annual physical please call patient and have them schedule an appointment

## 2024-01-25 ENCOUNTER — TELEPHONE (OUTPATIENT)
Dept: FAMILY MEDICINE CLINIC | Facility: CLINIC | Age: 64
End: 2024-01-25

## 2024-01-25 NOTE — TELEPHONE ENCOUNTER
Reviewed below with pt. She was under the impression she was supposed to repeat in 10 years. Colonoscopy report from 9/18/2017 reviewed as below. Patient verbalized understanding and agrees with plan.

## 2024-01-25 NOTE — TELEPHONE ENCOUNTER
Pt is calling she said that Dr. Jones said he wanted her to have a colonoscopy but the GI doctor said it is too soon she had it 2018 and insurance will charge her a higher price if she has it before the 10 years. What should she do?

## 2024-01-25 NOTE — TELEPHONE ENCOUNTER
Lled and LMOM to have her call back to discuss colonoscopy as we have no control over the insurance and are OK with what ever the GI doctor wants to do.

## 2024-01-25 NOTE — TELEPHONE ENCOUNTER
Please check and clarify.    Normally insurance will only pay every 10 years without a co-pay.  Insurance is out of my control.  In 2018 she had a colonoscopy and they suggested coming back in 5 years.  If GI has looked at this and says that she does not need a colonoscopy I am okay with that.  If the insurance is making her put this towards deductible, I have no control over that.

## 2024-02-16 ENCOUNTER — TELEPHONE (OUTPATIENT)
Dept: FAMILY MEDICINE CLINIC | Facility: CLINIC | Age: 64
End: 2024-02-16

## 2024-02-16 DIAGNOSIS — Z13.0 SCREENING FOR IRON DEFICIENCY ANEMIA: ICD-10-CM

## 2024-02-16 DIAGNOSIS — Z13.29 SCREENING FOR THYROID DISORDER: ICD-10-CM

## 2024-02-16 DIAGNOSIS — N18.1 CKD (CHRONIC KIDNEY DISEASE) STAGE 1, GFR 90 ML/MIN OR GREATER: ICD-10-CM

## 2024-02-16 DIAGNOSIS — E07.9 THYROID DISORDER: ICD-10-CM

## 2024-02-16 DIAGNOSIS — E11.9 DIET-CONTROLLED TYPE 2 DIABETES MELLITUS (HCC): Chronic | ICD-10-CM

## 2024-02-16 DIAGNOSIS — Z00.00 LABORATORY EXAMINATION ORDERED AS PART OF A ROUTINE GENERAL MEDICAL EXAMINATION: ICD-10-CM

## 2024-02-16 DIAGNOSIS — R73.9 HYPERGLYCEMIA: ICD-10-CM

## 2024-02-16 DIAGNOSIS — Z13.1 SCREENING FOR DIABETES MELLITUS: Primary | ICD-10-CM

## 2024-02-16 DIAGNOSIS — I10 ESSENTIAL HYPERTENSION: ICD-10-CM

## 2024-02-16 DIAGNOSIS — Z13.6 SCREENING FOR CARDIOVASCULAR CONDITION: ICD-10-CM

## 2024-02-16 DIAGNOSIS — E78.5 DYSLIPIDEMIA: ICD-10-CM

## 2024-02-16 NOTE — TELEPHONE ENCOUNTER
1. Screening for diabetes mellitus (Primary)  -     Comp Metabolic Panel (14); Future; Expected date: 02/16/2024  2. Diet-controlled type 2 diabetes mellitus (HCC)  Overview:  Dx 1/2015 with A1c 6.6% after several years of prediabetes, [place] 500 twice daily  Orders:  -     Lipid Panel; Future; Expected date: 02/16/2024  -     Comp Metabolic Panel (14); Future; Expected date: 02/16/2024  -     Hemoglobin A1C; Future; Expected date: 02/16/2024  -     Microalb/Creat Ratio, Random Urine; Future; Expected date: 02/16/2024  3. Screening for iron deficiency anemia  -     CBC With Differential With Platelet; Future; Expected date: 02/16/2024  4. Screening for thyroid disorder  -     TSH W Reflex To Free T4; Future; Expected date: 02/16/2024  5. Screening for cardiovascular condition  -     Lipid Panel; Future; Expected date: 02/16/2024  6. Laboratory examination ordered as part of a routine general medical examination  -     TSH W Reflex To Free T4; Future; Expected date: 02/16/2024  -     Lipid Panel; Future; Expected date: 02/16/2024  -     CBC With Differential With Platelet; Future; Expected date: 02/16/2024  -     Comp Metabolic Panel (14); Future; Expected date: 02/16/2024  -     Hemoglobin A1C; Future; Expected date: 02/16/2024  7. Hyperglycemia  -     Comp Metabolic Panel (14); Future; Expected date: 02/16/2024  -     Hemoglobin A1C; Future; Expected date: 02/16/2024  8. Dyslipidemia  -     TSH W Reflex To Free T4; Future; Expected date: 02/16/2024  -     Lipid Panel; Future; Expected date: 02/16/2024  9. CKD (chronic kidney disease) stage 1, GFR 90 ml/min or greater  -     CBC With Differential With Platelet; Future; Expected date: 02/16/2024  -     Microalb/Creat Ratio, Random Urine; Future; Expected date: 02/16/2024  10. Thyroid disorder  -     TSH W Reflex To Free T4; Future; Expected date: 02/16/2024  11. Essential hypertension  Overview:  rampipril 5 and hydrochlorothiazide 25  Orders:  -     TSH W Reflex To  Free T4; Future; Expected date: 02/16/2024  -     CBC With Differential With Platelet; Future; Expected date: 02/16/2024  -     Comp Metabolic Panel (14); Future; Expected date: 02/16/2024       OK to notify. Thanks, Erwin Jones MD

## 2024-02-16 NOTE — TELEPHONE ENCOUNTER
Please enter lab orders for the patient's upcoming physical appointment.     Physical scheduled:   Your appointments       Date & Time Appointment Department ([place])    Feb 23, 2024  9:30 AM CST Physical - Established with Eric Jones MD [facility] ([facility])              [facility]  [address]  [facility] [address]  [phone]           Preferred lab: [facility] ([facility])     The patient has been notified to complete fasting labs prior to their physical appointment.

## 2024-05-21 ENCOUNTER — OFFICE VISIT (OUTPATIENT)
Dept: HEMATOLOGY/ONCOLOGY | Facility: HOSPITAL | Age: 64
End: 2024-05-21
Attending: INTERNAL MEDICINE

## 2024-05-21 VITALS
BODY MASS INDEX: 29 KG/M2 | SYSTOLIC BLOOD PRESSURE: 153 MMHG | OXYGEN SATURATION: 98 % | RESPIRATION RATE: 16 BRPM | WEIGHT: 158.5 LBS | DIASTOLIC BLOOD PRESSURE: 77 MMHG | TEMPERATURE: 99 F | HEART RATE: 69 BPM

## 2024-05-21 DIAGNOSIS — C91.01 ACUTE LYMPHOCYTIC LEUKEMIA IN REMISSION (HCC): ICD-10-CM

## 2024-05-21 DIAGNOSIS — D64.9 ANEMIA, UNSPECIFIED TYPE: ICD-10-CM

## 2024-05-21 LAB
ALBUMIN SERPL-MCNC: 3.8 G/DL (ref 3.4–5)
ALBUMIN/GLOB SERPL: 1.1 {RATIO} (ref 1–2)
ALP LIVER SERPL-CCNC: 63 U/L
ALT SERPL-CCNC: 31 U/L
ANION GAP SERPL CALC-SCNC: 5 MMOL/L (ref 0–18)
AST SERPL-CCNC: 25 U/L (ref 15–37)
BASOPHILS # BLD AUTO: 0.05 X10(3) UL (ref 0–0.2)
BASOPHILS NFR BLD AUTO: 0.5 %
BILIRUB SERPL-MCNC: 0.5 MG/DL (ref 0.1–2)
BUN BLD-MCNC: 14 MG/DL (ref 9–23)
CALCIUM BLD-MCNC: 8.9 MG/DL (ref 8.5–10.1)
CHLORIDE SERPL-SCNC: 106 MMOL/L (ref 98–112)
CO2 SERPL-SCNC: 28 MMOL/L (ref 21–32)
CREAT BLD-MCNC: 0.6 MG/DL
DEPRECATED HBV CORE AB SER IA-ACNC: 153.7 NG/ML
EGFRCR SERPLBLD CKD-EPI 2021: 101 ML/MIN/1.73M2 (ref 60–?)
EOSINOPHIL # BLD AUTO: 0.15 X10(3) UL (ref 0–0.7)
EOSINOPHIL NFR BLD AUTO: 1.6 %
ERYTHROCYTE [DISTWIDTH] IN BLOOD BY AUTOMATED COUNT: 16.5 %
FASTING STATUS PATIENT QL REPORTED: NO
GLOBULIN PLAS-MCNC: 3.4 G/DL (ref 2.8–4.4)
GLUCOSE BLD-MCNC: 114 MG/DL (ref 70–99)
HCT VFR BLD AUTO: 34.8 %
HGB BLD-MCNC: 10.4 G/DL
IMM GRANULOCYTES # BLD AUTO: 0.04 X10(3) UL (ref 0–1)
IMM GRANULOCYTES NFR BLD: 0.4 %
IRON SATN MFR SERPL: 19 %
IRON SERPL-MCNC: 92 UG/DL
LDH SERPL L TO P-CCNC: 118 U/L
LYMPHOCYTES # BLD AUTO: 3.76 X10(3) UL (ref 1–4)
LYMPHOCYTES NFR BLD AUTO: 38.9 %
MCH RBC QN AUTO: 19.1 PG (ref 26–34)
MCHC RBC AUTO-ENTMCNC: 29.9 G/DL (ref 31–37)
MCV RBC AUTO: 63.9 FL
MONOCYTES # BLD AUTO: 0.6 X10(3) UL (ref 0.1–1)
MONOCYTES NFR BLD AUTO: 6.2 %
NEUTROPHILS # BLD AUTO: 5.07 X10 (3) UL (ref 1.5–7.7)
NEUTROPHILS # BLD AUTO: 5.07 X10(3) UL (ref 1.5–7.7)
NEUTROPHILS NFR BLD AUTO: 52.4 %
OSMOLALITY SERPL CALC.SUM OF ELEC: 289 MOSM/KG (ref 275–295)
PLATELET # BLD AUTO: 386 10(3)UL (ref 150–450)
PLATELET MORPHOLOGY: NORMAL
POTASSIUM SERPL-SCNC: 3.8 MMOL/L (ref 3.5–5.1)
PROT SERPL-MCNC: 7.2 G/DL (ref 6.4–8.2)
RBC # BLD AUTO: 5.45 X10(6)UL
SODIUM SERPL-SCNC: 139 MMOL/L (ref 136–145)
TIBC SERPL-MCNC: 490 UG/DL (ref 240–450)
TRANSFERRIN SERPL-MCNC: 329 MG/DL (ref 200–360)
URATE SERPL-MCNC: 3.3 MG/DL
VIT B12 SERPL-MCNC: 1164 PG/ML (ref 193–986)
WBC # BLD AUTO: 9.7 X10(3) UL (ref 4–11)

## 2024-05-21 PROCEDURE — 99214 OFFICE O/P EST MOD 30 MIN: CPT | Performed by: INTERNAL MEDICINE

## 2024-05-21 RX ORDER — HYDROCHLOROTHIAZIDE 25 MG/1
25 TABLET ORAL EVERY MORNING
COMMUNITY
Start: 2024-05-13

## 2024-05-21 RX ORDER — FAMOTIDINE 20 MG/1
20 TABLET, FILM COATED ORAL DAILY
COMMUNITY
Start: 2024-02-09

## 2024-05-21 RX ORDER — LOSARTAN POTASSIUM 25 MG/1
25 TABLET ORAL 2 TIMES DAILY
COMMUNITY
Start: 2024-02-12

## 2024-05-21 NOTE — PROGRESS NOTES
No [facility] Progress Note      Patient Name:  Renetta Corbett  YOB: 1960  Medical Record Number:  [id number]    Date of visit: 5/21/2024    CHIEF COMPLAINT: H/o ALL.    HPI:     63 year old that I saw as a new patient in 5/18 after she had to switch providers for insurance related purposes.  Diagnosed with AML L in 2002.  Marrow biopsy 7/02-60% blasts.  Received induction chemotherapy with vincristine, daunorubicin, prednisone.  One course L-asparaginase.  Consolidation chemotherapy with cyclophosphamide, cytarabine, 6-mercaptopurine, intrathecal methotrexate.  Treatment complicated by pneumocystis pneumonia, neutropenic fever, rectal abscess.  Chemotherapy completed 2003.  Prophylactic cranial irradiation completed 2003.  Has been in remission since then.    Presents for evaluation.  No new complaints.  Came back from [place], recovering from URI.  No fever, chills, fatigue, night sweats or unexpected weight loss.    SOCIAL HISTORY:    ,  is retired psychiatrist.  38-year-old son that works in finance, lives partly in [place] and partly in [place].  Exercises regularly.    ROS:     Constitutional: no fever, chills fatigue,night sweats or weight loss  Neurologic: no headache, seizures, diplopia or weakness  Respiratory: no cough, SOB or hemoptysis  Cardiovascular: no chest pain, ankle swelling, CORTEZ  GI: no nausea, vomiting, diarrhea or BRBPR  Musculoskeletal: no body-ache or joint pain  Dermatologic: no alopecia, rash, pruritis  : no hematuria, dysuria or frequency  Psych: no confusion or depression   Heme: no easy bruising or bleeding     ALLERGIES:    Allergies   Allergen Reactions    Vancomycin RASH       MEDICATIONS:    Current Outpatient Medications   Medication Sig Dispense Refill    hydroCHLOROthiazide 25 MG Oral Tab Take 1 tablet (25 mg total) by mouth every morning.      losartan 25 MG Oral Tab Take 1 tablet (25 mg total) by mouth 2 (two) times daily.       famotidine 20 MG Oral Tab Take 1 tablet (20 mg total) by mouth daily.      PEG 3350-KCl-Na Bicarb-NaCl 420 g Oral Recon Soln Take as directed by physician 4000 mL 0    fluticasone propionate 50 MCG/ACT Nasal Suspension 2 sprays by Each Nare route daily. 3 each 1    ALPRAZolam 0.5 MG Oral Tab TAKE 1 TABLET BY MOUTH EVERY NIGHT AT BEDTIME AS NEEDED FOR SLEEP 30 tablet 3    levothyroxine 75 MCG Oral Tab Take 1 tablet (75 mcg total) by mouth before breakfast. 90 tablet 3    metFORMIN 500 MG Oral Tab Take 1 tablet (500 mg total) by mouth 2 (two) times daily with meals. 180 tablet 3    ramipril 5 MG Oral Cap Take 1 capsule (5 mg total) by mouth daily. 90 capsule 3    Fenofibrate 134 MG Oral Cap Take 1 capsule by mouth daily. 90 capsule 3    SIMVASTATIN 40 MG Oral Tab TAKE 1 TABLET(40 MG) BY MOUTH EVERY NIGHT 90 tablet 1    fluvoxaMINE 50 MG Oral Tab Take 1 tablet (50 mg total) by mouth daily.      Blood Glucose Monitoring Suppl (ACCU-CHEK NASIR PLUS) w/Device Does not apply Kit Once daily checking 1 kit 0    aspirin 81 MG Oral Tab EC Take 1 tablet (81 mg total) by mouth daily.      ferrous sulfate 325 (65 FE) MG Oral Tab EC Take 1 tablet (325 mg total) by mouth daily with breakfast. QOD      Cyanocobalamin (VITAMIN B 12 OR) Take by mouth.      Omega-3-acid Ethyl Esters 1 g Oral Cap Take 1 capsule (1 g total) by mouth daily.      Calcium Carbonate-Vit D-Min (CALCIUM 1200 OR) Take by mouth daily.      Coenzyme Q10 (CO Q-10) 100 MG Oral Cap Take 1 tablet by mouth daily.      Cholecalciferol (VITAMIN D3) 2000 UNITS Oral Cap Take 1 Tab by mouth daily.      Multiple Vitamin (MULTIVITAMIN OR) Take  by mouth daily.         VITALS:  Height: --  Weight: 71.9 kg (158 lb 8 oz) (1046)  BSA (Calculated - sq m): --  Pulse: 69 (1046)  BP: 153/77 (1046)  Temp: 98.6 °F (37 °C) (1046)  Do Not Use - Resp Rate: --  SpO2: 98 % (1046)    PS:  ECO    PHYSICAL EXAM:    General: alert and oriented x 3, not in  acute distress.  HEENT: DON, oropharynx  clear.  Neck: supple.  No JVD /adenopathy.  CVS: S1S2, regular  Rhythm, no murmurs.   Lungs: Clear to auscultation and percussion.  Abdomen: Soft, non tender, no hepato-splenomegaly.  Extremities:  No edema.  CNS: no focal deficit      Emotional well being: Patient's emotional well being was assessed.  No issues requiring acute psychosocial intervention were identified.     LABS:     Recent Results (from the past 24 hour(s))   Comp Metabolic Panel (14)    Collection Time: 05/21/24 10:38 AM   Result Value Ref Range    Glucose 114 (H) 70 - 99 mg/dL    Sodium 139 136 - 145 mmol/L    Potassium 3.8 3.5 - 5.1 mmol/L    Chloride 106 98 - 112 mmol/L    CO2 28.0 21.0 - 32.0 mmol/L    Anion Gap 5 0 - 18 mmol/L    BUN 14 9 - 23 mg/dL    Creatinine 0.60 0.55 - 1.02 mg/dL    Calcium, Total 8.9 8.5 - 10.1 mg/dL    Calculated Osmolality 289 275 - 295 mOsm/kg    eGFR-Cr 101 >=60 mL/min/1.73m2    AST 25 15 - 37 U/L    ALT 31 13 - 56 U/L    Alkaline Phosphatase 63 50 - 130 U/L    Bilirubin, Total 0.5 0.1 - 2.0 mg/dL    Total Protein 7.2 6.4 - 8.2 g/dL    Albumin 3.8 3.4 - 5.0 g/dL    Globulin  3.4 2.8 - 4.4 g/dL    A/G Ratio 1.1 1.0 - 2.0    Patient Fasting for CMP? No    LDH    Collection Time: 05/21/24 10:38 AM   Result Value Ref Range     84 - 246 U/L   Uric Acid    Collection Time: 05/21/24 10:38 AM   Result Value Ref Range    Uric Acid 3.3 2.6 - 6.0 mg/dL   Ferritin    Collection Time: 05/21/24 10:38 AM   Result Value Ref Range    Ferritin 153.7 18.0 - 340.0 ng/mL   Iron And Tibc    Collection Time: 05/21/24 10:38 AM   Result Value Ref Range    Iron 92 50 - 170 ug/dL    Transferrin 329 200 - 360 mg/dL    Total Iron Binding Capacity 490 (H) 240 - 450 ug/dL    % Saturation 19 15 - 50 %   Vitamin B12    Collection Time: 05/21/24 10:38 AM   Result Value Ref Range    Vitamin B12 1,164 (H) 193 - 986 pg/mL   CBC W/ DIFFERENTIAL    Collection Time: 05/21/24 10:38 AM   Result Value Ref  Range    WBC 9.7 4.0 - 11.0 x10(3) uL    RBC 5.45 (H) 3.80 - 5.30 x10(6)uL    HGB 10.4 (L) 12.0 - 16.0 g/dL    HCT 34.8 (L) 35.0 - 48.0 %    .0 150.0 - 450.0 10(3)uL    MCV 63.9 (L) 80.0 - 100.0 fL    MCH 19.1 (L) 26.0 - 34.0 pg    MCHC 29.9 (L) 31.0 - 37.0 g/dL    RDW 16.5 %    Neutrophil Absolute Prelim 5.07 1.50 - 7.70 x10 (3) uL    Neutrophil Absolute 5.07 1.50 - 7.70 x10(3) uL    Lymphocyte Absolute 3.76 1.00 - 4.00 x10(3) uL    Monocyte Absolute 0.60 0.10 - 1.00 x10(3) uL    Eosinophil Absolute 0.15 0.00 - 0.70 x10(3) uL    Basophil Absolute 0.05 0.00 - 0.20 x10(3) uL    Immature Granulocyte Absolute 0.04 0.00 - 1.00 x10(3) uL    Neutrophil % 52.4 %    Lymphocyte % 38.9 %    Monocyte % 6.2 %    Eosinophil % 1.6 %    Basophil % 0.5 %    Immature Granulocyte % 0.4 %   Scan slide    Collection Time: 05/21/24 10:38 AM   Result Value Ref Range    Slide Review Slide reviewed,morphology review added    RBC Morphology Scan    Collection Time: 05/21/24 10:38 AM   Result Value Ref Range    RBC Morphology See morphology below (A) Normal, Slide reviewed, see previous RBC morphology.    Platelet Morphology Normal Normal    Hypochromia 1+      Microcytosis 1+      Ovalocytes 1+           ASSESSMENT AND PLAN:     # H/o ALL: ALL diag 2002.  Completed chemotherapy 2003 followed by PCI 2003.  In remission since then.  Labs today appear stable.  Continue to follow. Requests flow cytometry, can do annually.     # Anemia: Hb better, on oral iron. Colonoscopy 2013, repeat due. Labs pending.    # Microcytosis: Hb electrophoresis 2002-elevated hemoglobin A2, suggestive of beta thalassemia trait.    # Hypercalcemia: resolved.     ORDERS PLACED:    Return in about 1 year (around 5/21/2025) for Labs, MD visit.    Martha Leigh M.D.    [facility]    [address] [place], [address]    5/21/2024

## 2024-05-21 NOTE — PROGRESS NOTES
Education Record    Learner:  Patient    Disease / Diagnosis: H.O. AML    Barriers / Limitations:  None   Comments:    Method:  Discussion   Comments:    General Topics:  Plan of care reviewed   Comments:    Outcome:  Shows understanding   Comments:   Pt recently traveled to [place], had a cold and feeling some fatigue still.   PCP recommended colonoscopy, last one 6 years ago.   Let her know Dr Leigh ordered iron studies.

## 2024-06-04 ENCOUNTER — TELEPHONE (OUTPATIENT)
Dept: HEMATOLOGY/ONCOLOGY | Facility: HOSPITAL | Age: 64
End: 2024-06-04

## 2024-06-04 NOTE — TELEPHONE ENCOUNTER
Renetta [phone] needs cbc cmp total iron binding all labs on last viist sent over. Dr. Rupesh nesbitt  [facility] fax: [phone]. Thanks Samia

## 2024-06-14 LAB
CD10 CELLS NFR SPEC: <1 %
CD10/CD19: <1 %
CD19 CELLS NFR SPEC: 23 %
CD19+/CD200+: 20 %
CD2 CELLS NFR SPEC: 75 %
CD20 CELLS NFR SPEC: 23 %
CD200 CELLS: 23 %
CD3 CELLS NFR SPEC: 64 %
CD3+/TCRGD+: <1 %
CD3+CD4+ CELLS NFR SPEC: 37 %
CD3+CD4+ CELLS/CD3+CD8+ CLL SPEC: 1.4
CD3+CD8+ CELLS NFR SPEC: 27 %
CD3-/CD56+: 12 %
CD34 CELLS NFR SPEC: <1 %
CD38 CELLS NFR SPEC: 5 %
CD38+/CD19+: <1 %
CD45 CELLS NFR SPEC: 100 %
CD5 CELLS NFR SPEC: 65 %
CD5/CD19 CELLS: 2 %
CD7 CELLS NFR SPEC: 72 %
CELL SURF KAPPA/LAMBDA RATIO: 1.3
CELL SURF LAMBDA LIGHT CHAIN: 9 %
CELL SURFACE KAPPA LIGHT CHAIN: 12 %
TCR G-D CELLS NFR SPEC: 1 %

## 2024-08-15 ENCOUNTER — APPOINTMENT (OUTPATIENT)
Dept: GENERAL RADIOLOGY | Age: 64
End: 2024-08-15

## 2024-08-15 ENCOUNTER — HOSPITAL ENCOUNTER (OUTPATIENT)
Dept: GENERAL RADIOLOGY | Facility: HOSPITAL | Age: 64
Discharge: HOME | End: 2024-08-15
Attending: INTERNAL MEDICINE
Payer: COMMERCIAL

## 2024-08-15 DIAGNOSIS — T18.2XXA GASTRIC FOREIGN BODY: ICD-10-CM

## 2024-08-15 PROCEDURE — 74019 RADEX ABDOMEN 2 VIEWS: CPT | Performed by: INTERNAL MEDICINE

## 2024-09-17 LAB
AMB EXT CHOLESTEROL, TOTAL: 173 MG/DL
AMB EXT CREATININE: 0.62 MG/DL
AMB EXT EGFR NON-AA: 90
AMB EXT GLUCOSE: 71 MG/DL
AMB EXT HDL CHOLESTEROL: 62 MG/DL
AMB EXT HEMOGLOBIN: 10.3
AMB EXT HGBA1C: 6.6 %
AMB EXT LDL CHOLESTEROL, DIRECT: 89 MG/DL
AMB EXT TRIGLYCERIDES: 120 MG/DL
AMB EXT TSH: 2.7 MIU/ML
AMB EXT VITAMIN D, 25-HYDROXY: 37
AMB EXT WBC: 8.1 X10(3)UL

## 2024-09-24 NOTE — ASSESSMENT & PLAN NOTE
BP shows poor control with last BP of 153/77. Lifestyle changes, diet, exercise and weight loss were counseled. Medication changes as listed.   5/21/2024: Potassium 3.8; Creatinine 0.60; eGFR-Cr 101  BP Meds: hydroCHLOROthiazide Tabs - 25 MG; losartan Tabs - 25 MG; ramipril Caps - 5 MG   ACE/ARB Adherence Poor at 58% workgin on better compliance

## 2024-09-24 NOTE — ASSESSMENT & PLAN NOTE
Cholesterol shows Good control. Long term heart-healthy diet and lifestyle discussed and encouraged to reduce risk of cardiovascular disease.  10/19/2022: Cholesterol, Total 158; HDL Cholesterol 53; Triglycerides 107; LDL Cholesterol 86  Cholesterol Meds: Fenofibrate Caps - 134 MG; omega-3-acid ethyl esters Caps - 1 g; simvastatin Tabs - 40 MG  stable  Continue with current treatment plan

## 2024-09-24 NOTE — ASSESSMENT & PLAN NOTE
Thyroid shows Good control.   10/19/2022: TSH 3.280  Thryoid Meds: levothyroxine Tabs - 75 MCG well controlled current treatment plan effective, no change in therapy

## 2024-09-24 NOTE — ASSESSMENT & PLAN NOTE
Diabetes: A1c is 6.6 done 9/17/2024 which shows Excellent control. Continue current meds and lifestyle modification. Some sugars now too low with recent AM fasitng at 71 down form 140's last year and A1c down form poor contol to low levels this year.   DM Meds: metFORMIN Tabs - 500 MG      Diabetic Complications: Hyperglycemia: 7.4% 9/7/2023, GLU 71. Glucose now low and hypoglycemie with recent measure fasting at 71  MicroAlbuminuria: 35.8 mg/dL  Proteinuria: 35.8 mg/dL  Cataract history   Diabetes is : unchanged Continue current treatment regimen. Reassess Diabetes in : in 3 months

## 2024-09-24 NOTE — ASSESSMENT & PLAN NOTE
Clinical Course: stable   Good control    Antidepressant Meds: fluvoxaMINE Tabs - 50 MG  Anxiolytic Meds: ALPRAZolam Tabs - 0.5 MG

## 2024-09-24 NOTE — ASSESSMENT & PLAN NOTE
Last Dexa Scan:    XR DEXA BONE DENSITOMETRY (CPT=77080) 10/17/2022   Stable, continue present management and continue to monitor for progression

## 2024-09-24 NOTE — ASSESSMENT & PLAN NOTE
Stable, continue present management and continue to monitor for progression 5/21/2024: WBC 9.7; HGB 10.4 (L); .0; MCV 63.9 (L)

## 2024-09-25 ENCOUNTER — OFFICE VISIT (OUTPATIENT)
Dept: FAMILY MEDICINE CLINIC | Facility: CLINIC | Age: 64
End: 2024-09-25
Payer: COMMERCIAL

## 2024-09-25 VITALS
RESPIRATION RATE: 16 BRPM | DIASTOLIC BLOOD PRESSURE: 70 MMHG | HEART RATE: 76 BPM | SYSTOLIC BLOOD PRESSURE: 134 MMHG | HEIGHT: 62 IN | BODY MASS INDEX: 28.67 KG/M2 | WEIGHT: 155.81 LBS

## 2024-09-25 DIAGNOSIS — Z12.31 VISIT FOR SCREENING MAMMOGRAM: ICD-10-CM

## 2024-09-25 DIAGNOSIS — I10 ESSENTIAL HYPERTENSION: ICD-10-CM

## 2024-09-25 DIAGNOSIS — D56.1 BETA-THALASSEMIA (HCC): ICD-10-CM

## 2024-09-25 DIAGNOSIS — M81.0 AGE-RELATED OSTEOPOROSIS WITHOUT CURRENT PATHOLOGICAL FRACTURE: ICD-10-CM

## 2024-09-25 DIAGNOSIS — H25.13 AGE-RELATED NUCLEAR CATARACT OF BOTH EYES: ICD-10-CM

## 2024-09-25 DIAGNOSIS — Z00.00 ANNUAL PHYSICAL EXAM: Primary | ICD-10-CM

## 2024-09-25 DIAGNOSIS — Z78.0 ASYMPTOMATIC MENOPAUSAL STATE: ICD-10-CM

## 2024-09-25 DIAGNOSIS — E78.2 MIXED HYPERLIPIDEMIA: ICD-10-CM

## 2024-09-25 DIAGNOSIS — F32.1 CURRENT MODERATE EPISODE OF MAJOR DEPRESSIVE DISORDER WITHOUT PRIOR EPISODE (HCC): ICD-10-CM

## 2024-09-25 DIAGNOSIS — Z78.0 ASYMPTOMATIC MENOPAUSE: ICD-10-CM

## 2024-09-25 DIAGNOSIS — C91.01 ACUTE LYMPHOCYTIC LEUKEMIA IN REMISSION (HCC): ICD-10-CM

## 2024-09-25 DIAGNOSIS — E11.649 CONTROLLED TYPE 2 DIABETES MELLITUS WITH HYPOGLYCEMIA, WITHOUT LONG-TERM CURRENT USE OF INSULIN (HCC): ICD-10-CM

## 2024-09-25 DIAGNOSIS — E03.9 ACQUIRED HYPOTHYROIDISM: ICD-10-CM

## 2024-09-25 RX ORDER — ALPRAZOLAM 0.5 MG
TABLET ORAL
Qty: 30 TABLET | Refills: 3 | Status: SHIPPED | OUTPATIENT
Start: 2024-09-25

## 2024-09-25 RX ORDER — SIMVASTATIN 40 MG
40 TABLET ORAL NIGHTLY
Qty: 90 TABLET | Refills: 1 | Status: SHIPPED | OUTPATIENT
Start: 2024-09-25

## 2024-09-25 RX ORDER — LEVOTHYROXINE SODIUM 75 UG/1
75 TABLET ORAL
Qty: 90 TABLET | Refills: 3 | Status: SHIPPED | OUTPATIENT
Start: 2024-09-25

## 2024-09-25 NOTE — PROGRESS NOTES
Renetta Corbett is a 63 year old female who presents for a complete physical exam.     had concerns including Physical (WW, no pap see's gyne ).   No topic due editable text      Subjective:     Symptoms: is menopausal. Patient complains of doing well.  Last Pap 3 years ago, diabetes is doing quite well right now with an A1c at [facility] on .  Last A1c value was 6.6% done 2024. .   Tobacco:  She has never smoked tobacco.       Wt Readings from Last 4 Encounters:   24 155 lb 12.8 oz (70.7 kg)   24 158 lb 8 oz (71.9 kg)   23 157 lb 3.2 oz (71.3 kg)   02/15/23 159 lb 6 oz (72.3 kg)     Body mass index is 28.5 kg/m².     The 10-year ASCVD risk score (Raj CERVANTES, et al., 2019) is: 10.7%    Values used to calculate the score:      Age: 63 years      Sex: Female      Is Non- : No      Diabetic: Yes      Tobacco smoker: No      Systolic Blood Pressure: 134 mmHg      Is BP treated: Yes      HDL Cholesterol: 62 mg/dL      Total Cholesterol: 173 mg/dL    Chief Complaint Reviewed and Verified  Nursing Notes Reviewed and   Verified  Tobacco Reviewed  Allergies Reviewed  Medications Reviewed    Problem List Reviewed  Medical History Reviewed  Surgical History   Reviewed  OB Status Reviewed  Family History Reviewed          Her family history includes Diabetes in her maternal grandfather and mother.   She  reports that she has never smoked. She has never used smokeless tobacco. She reports current alcohol use. She reports that she does not use drugs.    Exercise: three times per week, walking.  Diet: watches sugar closely    GYNE HISTORY: Menstrual History:  OB History    Para Term  AB Living   3 2 2 0 1 1   SAB IAB Ectopic Multiple Live Births   0 1 0 0 2      Menarche age:    No LMP recorded. (Menstrual status: Menopause).       Health Maintenance Due   Topic Date Due    Diabetes Care Foot Exam  2017    Diabetes Care Dilated Eye Exam   08/11/2022    DTaP,Tdap,and Td Vaccines (2 - Td or Tdap) 08/30/2023    Annual Physical  10/03/2023    Diabetes Care: Microalb/Creat Ratio  10/19/2023    Annual Depression Screening  01/01/2024    Diabetes Care A1C  07/04/2024    Pap Smear  07/08/2024    COVID-19 Vaccine (6 - 2023-24 season) 09/01/2024    Mammogram  09/19/2024    DEXA Scan  10/17/2024         Review of Systems   Constitutional: Negative.  Negative for activity change, appetite change, chills and fever.   HENT: Negative.     Eyes: Negative.    Respiratory: Negative.  Negative for shortness of breath.    Cardiovascular: Negative.  Negative for chest pain and palpitations.   Gastrointestinal: Negative.  Negative for abdominal pain.   Genitourinary: Negative.  Negative for dysuria.   Musculoskeletal:  Negative for arthralgias.   Skin: Negative.  Negative for rash.   Allergic/Immunologic: Negative.    Neurological: Negative.         Results:    Lab Results   Component Value Date/Time    WBC 8.1 09/17/2024 12:00 AM    HGB 10.3 09/17/2024 12:00 AM    .0 05/21/2024 10:38 AM      Lab Results   Component Value Date/Time    GLU 71 09/17/2024 12:00 AM     05/21/2024 10:38 AM    K 3.8 05/21/2024 10:38 AM     05/21/2024 10:38 AM    CO2 28.0 05/21/2024 10:38 AM    CREATSERUM 0.62 09/17/2024 12:00 AM    CA 8.9 05/21/2024 10:38 AM    ALB 3.8 05/21/2024 10:38 AM    TP 7.2 05/21/2024 10:38 AM    ALKPHO 63 05/21/2024 10:38 AM    AST 25 05/21/2024 10:38 AM    ALT 31 05/21/2024 10:38 AM    BILT 0.5 05/21/2024 10:38 AM    TSH 2.700 09/17/2024 12:00 AM    T4F 1.2 05/09/2018 02:49 PM        Lab Results   Component Value Date/Time    CHOLEST 173 09/17/2024 12:00 AM    HDL 62 09/17/2024 12:00 AM    TRIG 120 09/17/2024 12:00 AM    LDL 86 10/19/2022 09:16 AM    NONHDLC 105 10/19/2022 09:16 AM       Last A1c value was 6.6% done 9/17/2024.     Vitamin D:     Lab Results   Component Value Date    VITD 37 09/17/2024          Objective:    EXAM:  /70   Pulse  76   Resp 16   Ht 5' 2\" (1.575 m)   Wt 155 lb 12.8 oz (70.7 kg)   BMI 28.50 kg/m²  Estimated body mass index is 28.5 kg/m² as calculated from the following:    Height as of this encounter: 5' 2\" (1.575 m).    Weight as of this encounter: 155 lb 12.8 oz (70.7 kg).   Physical Exam  Vitals and nursing note reviewed.   Constitutional:       General: She is not in acute distress.     Appearance: Normal appearance.   HENT:      Head: Normocephalic and atraumatic.      Right Ear: Tympanic membrane and external ear normal.      Left Ear: Tympanic membrane and external ear normal.      Nose: Nose normal.      Mouth/Throat:      Mouth: Mucous membranes are moist.   Eyes:      Extraocular Movements: Extraocular movements intact.      Pupils: Pupils are equal, round, and reactive to light.   Cardiovascular:      Rate and Rhythm: Normal rate and regular rhythm.      Pulses: Normal pulses.           Carotid pulses are 2+ on the right side and 2+ on the left side.       Radial pulses are 2+ on the right side and 2+ on the left side.        Dorsalis pedis pulses are 2+ on the right side and 2+ on the left side.        Posterior tibial pulses are 2+ on the right side and 2+ on the left side.      Heart sounds: Normal heart sounds, S1 normal and S2 normal. No murmur heard.  Pulmonary:      Effort: Pulmonary effort is normal. No respiratory distress.      Breath sounds: Normal breath sounds. No wheezing.   Abdominal:      General: Abdomen is flat. Bowel sounds are normal. There is no distension.      Palpations: Abdomen is soft.      Tenderness: There is no abdominal tenderness.   Musculoskeletal:         General: Normal range of motion.      Cervical back: Normal range of motion and neck supple.      Right lower leg: No edema.      Left lower leg: No edema.   Skin:     General: Skin is warm and dry.      Capillary Refill: Capillary refill takes less than 2 seconds.      Findings: No rash.   Neurological:      General: No focal  deficit present.      Mental Status: She is alert and oriented to person, place, and time.   Psychiatric:         Mood and Affect: Mood normal.         Behavior: Behavior normal.         Thought Content: Thought content normal.         Judgment: Judgment normal.          Assessment & Plan:    Renetta Corbett is a 63 year old female who presents for a complete physical exam.   Pt's weight is Body mass index is 28.5 kg/m²., recommended low fat diet and aerobic exercise 30 minutes three times weekly.   Health maintenance, Up to date    Immunizations: Up to date   Immunization History   Administered Date(s) Administered    >=3 YRS TRI  MULTIDOSE VIAL (92568) FLU CLINIC 11/21/2023    Covid-19 Vaccine Pfizer 30 mcg/0.3 ml 02/27/2021, 03/19/2021, 10/19/2021    Covid-19 Vaccine Pfizer Bivalent 30mcg/0.3mL 01/02/2023    Covid-19 Vaccine Pfizer Jeff-Sucrose 30 mcg/0.3 ml 07/08/2022    FLU VAC QIV SPLIT 3 YRS AND OLDER (04702) 10/29/2019    FLUZONE 6 months and older PFS 0.5 ml (73195) 09/12/2016, 09/10/2018, 10/11/2022    Flucelvax 0.5 Ml Quad PFS Single Dose 10/07/2020    Flucelvax 0.5ml Vaccine 10/07/2020    Flucelvax Influenza vaccine, trivalent (ccIIV3), 0.5mL IM 10/07/2020, 11/06/2023    Fluvirin, 3 Years & >, Im 10/18/2013, 10/28/2021    Influenza 10/29/2019    Influenza(Afluria)0.5ml QIV PFS 10/29/2019    Pneumococcal Conjugate PCV20 08/01/2022    RSV, recombinant, RSVpreF, adjuvanted (Arexvy) 12/20/2023    TDAP 08/30/2013    Zoster Vaccine Recombinant Adjuvanted (Shingrix) 08/30/2021, 01/12/2022         Pt info given for: exercise, low fat diet, The patient indicates understanding of these issues and agrees to the plan.  The patient is asked to return for CPX in 1 years.    Assessment:  1. Annual physical exam (Primary)  2. 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 Rufus Lawson, WBC 8.0 on 4.6.2016.  Assessment & Plan:  Stable, continue present management and continue to monitor  for progression 5/21/2024: WBC 9.7; HGB 10.4 (L); .0; MCV 63.9 (L)   3. Controlled type 2 diabetes mellitus with hypoglycemia, without long-term current use of insulin ([facility])  Overview:  Dx 1/2015 with A1c 6.6% after several years of prediabetes, [place] 500 twice daily  Assessment & Plan:  Diabetes: A1c is 6.6 done 9/17/2024 which shows Excellent control. Continue current meds and lifestyle modification. Some sugars now too low with recent AM fasitng at 71 down form 140's last year and A1c down form poor contol to low levels this year.   DM Meds: metFORMIN Tabs - 500 MG      Diabetic Complications: Hyperglycemia: 7.4% 9/7/2023, GLU 71. Glucose now low and hypoglycemie with recent measure fasting at 71  MicroAlbuminuria: 35.8 mg/dL  Proteinuria: 35.8 mg/dL  Cataract history   Diabetes is : unchanged Continue current treatment regimen. Reassess Diabetes in : in 3 months   Orders:  -     metFORMIN HCl; Take 1 tablet (500 mg total) by mouth 2 (two) times daily with meals.  Dispense: 180 tablet; Refill: 3  4. Current moderate episode of major depressive disorder without prior episode ([facility])  Overview:  Alprazolam 0,5. pHQ9 score of 10 10/3/2022   Assessment & Plan:  Clinical Course: stable   Good control    Antidepressant Meds: fluvoxaMINE Tabs - 50 MG  Anxiolytic Meds: ALPRAZolam Tabs - 0.5 MG   5. Mixed hyperlipidemia  Overview:  Simvastatin 40, fenofibrate 133  Assessment & Plan:  Cholesterol shows Good control. Long term heart-healthy diet and lifestyle discussed and encouraged to reduce risk of cardiovascular disease.  10/19/2022: Cholesterol, Total 158; HDL Cholesterol 53; Triglycerides 107; LDL Cholesterol 86  Cholesterol Meds: Fenofibrate Caps - 134 MG; omega-3-acid ethyl esters Caps - 1 g; simvastatin Tabs - 40 MG  stable  Continue with current treatment plan   Orders:  -     Simvastatin; Take 1 tablet (40 mg total) by mouth nightly.  Dispense: 90 tablet; Refill: 1  6. Essential  hypertension  Overview:  rampipril 5 and hydrochlorothiazide 25  Assessment & Plan:  BP shows poor control with last BP of 153/77. Lifestyle changes, diet, exercise and weight loss were counseled. Medication changes as listed.   5/21/2024: Potassium 3.8; Creatinine 0.60; eGFR-Cr 101  BP Meds: hydroCHLOROthiazide Tabs - 25 MG; losartan Tabs - 25 MG; ramipril Caps - 5 MG   ACE/ARB Adherence Poor at 58% workgin on better compliance     7. Acquired hypothyroidism  Overview:  Levothyroxine 75 since 11/17  Assessment & Plan:  Thyroid shows Good control.   10/19/2022: TSH 3.280  Thryoid Meds: levothyroxine Tabs - 75 MCG well controlled current treatment plan effective, no change in therapy   Orders:  -     Levothyroxine Sodium; Take 1 tablet (75 mcg total) by mouth before breakfast.  Dispense: 90 tablet; Refill: 3  8. Age-related osteoporosis without current pathological fracture  Overview:  Dexa 8.2010 and 8.2012 showed T score -1.5  Assessment & Plan:  Last Dexa Scan:    XR DEXA BONE DENSITOMETRY (CPT=77080) 10/17/2022   Stable, continue present management and continue to monitor for progression   9. Beta-thalassemia (HCC)  Overview:  Managed by Dr Lawson, and explains microcytosis and mild anemia.  Assessment & Plan:  Stable, continue present management and continue to monitor for progression   Orders:  -     ALPRAZolam; TAKE 1 TABLET BY MOUTH EVERY NIGHT AT BEDTIME AS NEEDED FOR SLEEP  Dispense: 30 tablet; Refill: 3  10. Age-related nuclear cataract of both eyes  Assessment & Plan:  Stable, continue present management and continue to monitor for progression   11. Visit for screening mammogram  -     3D Mammogram Digital Screen, Bilateral (CPT=77067/26511); Future; Expected date: 09/25/2024  12. Asymptomatic menopause  -     Dexa Bone Density (CPT=77080); Future; Expected date: 09/25/2024  13. Asymptomatic menopausal state  -     Dexa Bone Density (CPT=77080); Future; Expected date: 09/25/2024  Better, labs recently  done, will repeat next year, discussed weight loss medications and better diet but she is doing better overall, bone density next year      I have discontinued Ms. Renetta Corbett's ramipril and PEG 3350-KCl-Na Bicarb-NaCl. I have also changed her simvastatin. Additionally, I am having her maintain her cholecalciferol, Multiple Vitamin (MULTIVITAMIN OR), Co Q-10, Calcium Carbonate-Vit D-Min (CALCIUM 1200 OR), omega-3-acid ethyl esters, aspirin, ferrous sulfate, Cyanocobalamin (VITAMIN B 12 OR), Accu-Chek Lenore Plus, fluvoxaMINE, fluticasone propionate, Fenofibrate, hydroCHLOROthiazide, losartan, famotidine, levothyroxine, metFORMIN, and ALPRAZolam.     No follow-ups on file.

## 2025-01-23 ENCOUNTER — TELEPHONE (OUTPATIENT)
Dept: FAMILY MEDICINE CLINIC | Facility: CLINIC | Age: 65
End: 2025-01-23

## 2025-01-23 DIAGNOSIS — E78.2 MIXED HYPERLIPIDEMIA: Primary | ICD-10-CM

## 2025-01-23 RX ORDER — FENOFIBRATE 134 MG/1
1 CAPSULE ORAL DAILY
Qty: 90 CAPSULE | Refills: 0 | Status: SHIPPED | OUTPATIENT
Start: 2025-01-23

## 2025-01-23 NOTE — TELEPHONE ENCOUNTER
Requested Prescriptions     Pending Prescriptions Disp Refills    Fenofibrate 134 MG Oral Cap 90 capsule 3     Sig: Take 1 capsule by mouth daily.     LOV 9/25/2024     Patient was asked to follow-up in: 3 months    Last labs 9/17/24    Appointment scheduled: Visit date not found     Medication refilled per protocol.    Please assist in scheduling follow up appt- diabetes

## 2025-01-23 NOTE — TELEPHONE ENCOUNTER
Pt needs a refill on her   Fenofibrate 134 MG Oral Cap     She is traveling and needs it filled earlier

## 2025-06-23 ENCOUNTER — PATIENT OUTREACH (OUTPATIENT)
Dept: FAMILY MEDICINE CLINIC | Facility: CLINIC | Age: 65
End: 2025-06-23

## (undated) NOTE — MR AVS SNAPSHOT
[address] [facility] [address]  [facility] [address]  [address] [facility] [address] [address]               Thank you for choosing us for your health care visit with Wenceslao Ta MD.  We are glad to serve you and happy to provide you with this summa Order:  Evaluate & Treat, Gastro (Internal)    [phone] [id number]   Phone:  [phone]   Fax:  [phone]         Referral Orders      Normal Orders This Visit    EVALUATE & Payton Rocha (INTERNAL) [[id number] ASPIR-81 OR   Take  by mouth daily. Biotin 5000 MCG Caps   Take by mouth daily. CALCIUM 1200 OR   Take by mouth daily. Co Q-10 100 MG Caps   Take 1 tablet by mouth daily.            Fenofibric Acid 135 MG Cpdr   Take 1 capsule

## (undated) NOTE — LETTER
Cecy Allred MD  • Debbie Marquez MD • Maritza Galan MD • DO Demarco Mancia PA-C • CONY Penny • Cristela Siemens, PA-C     Eye Exam Results for Diabetic Patients     As soon as the patient is seen please complete the form below an

## (undated) NOTE — MR AVS SNAPSHOT
After Visit Summary   7/8/2021    Kailyn Thornton    MRN: [id number]           Visit Information     Date & Time  7/8/2021  2:30 PM Provider  Tayla Sanchez MD Department  [facility], [address] [place]  Dept.  Phone  [phone] [facility] now offers Video Visits through [address] for adult and pediatric patients. Video Visits are available Monday - Friday for many common conditions such as allergies, colds, cough, fever, rash, sore throat, headache and pink eye.   The cost for a Video Vis Monday – Friday  4:00 pm – 10:00 pm   Saturday – Sunday  10:00 am – 4:00 pm  W[facility]-IN CARE  Emergency Medicine Providers  Conditions needing urgent attention, but are   non-life-threatening.     Also available by appointment Average cost  $120*     EMERGENCY